# Patient Record
Sex: FEMALE | Race: WHITE | NOT HISPANIC OR LATINO | Employment: FULL TIME | URBAN - METROPOLITAN AREA
[De-identification: names, ages, dates, MRNs, and addresses within clinical notes are randomized per-mention and may not be internally consistent; named-entity substitution may affect disease eponyms.]

---

## 2017-02-06 ENCOUNTER — AMBULATORY - HEALTHEAST (OUTPATIENT)
Dept: LAB | Facility: CLINIC | Age: 52
End: 2017-02-06

## 2017-02-06 DIAGNOSIS — B35.1 NAIL FUNGUS: ICD-10-CM

## 2017-02-08 ENCOUNTER — OFFICE VISIT - HEALTHEAST (OUTPATIENT)
Dept: PULMONOLOGY | Facility: OTHER | Age: 52
End: 2017-02-08

## 2017-02-08 DIAGNOSIS — F17.200 TOBACCO USE DISORDER: ICD-10-CM

## 2017-02-08 DIAGNOSIS — J45.40 MODERATE PERSISTENT ASTHMA WITHOUT COMPLICATION: ICD-10-CM

## 2017-02-20 ENCOUNTER — COMMUNICATION - HEALTHEAST (OUTPATIENT)
Dept: PODIATRY | Facility: CLINIC | Age: 52
End: 2017-02-20

## 2017-02-20 DIAGNOSIS — B35.1 NAIL FUNGUS: ICD-10-CM

## 2017-02-23 ENCOUNTER — AMBULATORY - HEALTHEAST (OUTPATIENT)
Dept: PULMONOLOGY | Facility: OTHER | Age: 52
End: 2017-02-23

## 2017-02-23 DIAGNOSIS — J45.909 ASTHMA: ICD-10-CM

## 2017-02-27 ENCOUNTER — AMBULATORY - HEALTHEAST (OUTPATIENT)
Dept: PULMONOLOGY | Facility: OTHER | Age: 52
End: 2017-02-27

## 2017-02-27 DIAGNOSIS — J45.909 ASTHMA: ICD-10-CM

## 2017-03-17 ENCOUNTER — AMBULATORY - HEALTHEAST (OUTPATIENT)
Dept: PULMONOLOGY | Facility: OTHER | Age: 52
End: 2017-03-17

## 2017-03-17 DIAGNOSIS — J45.40 MODERATE PERSISTENT ASTHMA WITHOUT COMPLICATION: ICD-10-CM

## 2017-03-31 ENCOUNTER — COMMUNICATION - HEALTHEAST (OUTPATIENT)
Dept: FAMILY MEDICINE | Facility: CLINIC | Age: 52
End: 2017-03-31

## 2017-03-31 DIAGNOSIS — F32.A DEPRESSION: ICD-10-CM

## 2017-03-31 DIAGNOSIS — F17.201 NICOTINE DEPENDENCE IN REMISSION: ICD-10-CM

## 2017-07-21 ENCOUNTER — OFFICE VISIT - HEALTHEAST (OUTPATIENT)
Dept: FAMILY MEDICINE | Facility: CLINIC | Age: 52
End: 2017-07-21

## 2017-07-21 ENCOUNTER — RECORDS - HEALTHEAST (OUTPATIENT)
Dept: GENERAL RADIOLOGY | Facility: CLINIC | Age: 52
End: 2017-07-21

## 2017-07-21 DIAGNOSIS — M79.672 LEFT FOOT PAIN: ICD-10-CM

## 2017-07-21 DIAGNOSIS — M79.672 PAIN IN LEFT FOOT: ICD-10-CM

## 2017-07-21 DIAGNOSIS — M25.562 LEFT KNEE PAIN: ICD-10-CM

## 2017-07-21 RX ORDER — ACETAMINOPHEN 500 MG
500 TABLET ORAL EVERY 6 HOURS PRN
Status: SHIPPED | COMMUNITY
Start: 2017-07-21

## 2017-07-25 ENCOUNTER — COMMUNICATION - HEALTHEAST (OUTPATIENT)
Dept: FAMILY MEDICINE | Facility: CLINIC | Age: 52
End: 2017-07-25

## 2017-07-25 DIAGNOSIS — M25.562 LEFT KNEE PAIN: ICD-10-CM

## 2017-07-25 DIAGNOSIS — M79.672 LEFT FOOT PAIN: ICD-10-CM

## 2017-07-25 LAB — ANA SER QL: 0.6 U

## 2017-09-15 ENCOUNTER — OFFICE VISIT - HEALTHEAST (OUTPATIENT)
Dept: PULMONOLOGY | Facility: OTHER | Age: 52
End: 2017-09-15

## 2017-09-15 ENCOUNTER — RECORDS - HEALTHEAST (OUTPATIENT)
Dept: ADMINISTRATIVE | Facility: OTHER | Age: 52
End: 2017-09-15

## 2017-09-15 DIAGNOSIS — F17.201 NICOTINE DEPENDENCE IN REMISSION: ICD-10-CM

## 2017-09-15 DIAGNOSIS — J45.40 MODERATE PERSISTENT ASTHMA WITHOUT COMPLICATION: ICD-10-CM

## 2017-09-18 ENCOUNTER — RECORDS - HEALTHEAST (OUTPATIENT)
Dept: GENERAL RADIOLOGY | Age: 52
End: 2017-09-18

## 2017-09-18 ENCOUNTER — OFFICE VISIT - HEALTHEAST (OUTPATIENT)
Dept: RHEUMATOLOGY | Facility: CLINIC | Age: 52
End: 2017-09-18

## 2017-09-18 DIAGNOSIS — M25.50 PAIN IN UNSPECIFIED JOINT: ICD-10-CM

## 2017-09-18 DIAGNOSIS — R76.8 RHEUMATOID FACTOR POSITIVE: ICD-10-CM

## 2017-09-18 DIAGNOSIS — M25.50 POLYARTHRALGIA: ICD-10-CM

## 2017-09-18 LAB — HCV AB SERPL QL IA: NEGATIVE

## 2017-09-18 ASSESSMENT — MIFFLIN-ST. JEOR: SCORE: 1195.52

## 2017-09-19 LAB
C-ANCA - HISTORICAL: NEGATIVE
P-ANCA - HISTORICAL: NEGATIVE

## 2017-10-18 ENCOUNTER — OFFICE VISIT - HEALTHEAST (OUTPATIENT)
Dept: FAMILY MEDICINE | Facility: CLINIC | Age: 52
End: 2017-10-18

## 2017-10-18 DIAGNOSIS — Z00.00 HEALTH CARE MAINTENANCE: ICD-10-CM

## 2017-10-18 DIAGNOSIS — Z12.31 VISIT FOR SCREENING MAMMOGRAM: ICD-10-CM

## 2017-10-18 DIAGNOSIS — Z23 NEED FOR VACCINATION: ICD-10-CM

## 2017-10-18 DIAGNOSIS — M25.50 JOINT PAIN: ICD-10-CM

## 2017-10-18 DIAGNOSIS — F34.1 DYSTHYMIA: ICD-10-CM

## 2017-10-18 DIAGNOSIS — J45.20 INTERMITTENT ASTHMA, UNCOMPLICATED: ICD-10-CM

## 2017-10-18 DIAGNOSIS — B00.9 HERPES: ICD-10-CM

## 2017-10-18 ASSESSMENT — MIFFLIN-ST. JEOR: SCORE: 1207.78

## 2017-11-02 ENCOUNTER — COMMUNICATION - HEALTHEAST (OUTPATIENT)
Dept: FAMILY MEDICINE | Facility: CLINIC | Age: 52
End: 2017-11-02

## 2017-11-03 ENCOUNTER — AMBULATORY - HEALTHEAST (OUTPATIENT)
Dept: FAMILY MEDICINE | Facility: CLINIC | Age: 52
End: 2017-11-03

## 2017-11-22 ENCOUNTER — OFFICE VISIT - HEALTHEAST (OUTPATIENT)
Dept: FAMILY MEDICINE | Facility: CLINIC | Age: 52
End: 2017-11-22

## 2017-11-22 DIAGNOSIS — G47.00 INSOMNIA: ICD-10-CM

## 2017-12-08 ENCOUNTER — HOSPITAL ENCOUNTER (OUTPATIENT)
Dept: MAMMOGRAPHY | Facility: HOSPITAL | Age: 52
Discharge: HOME | End: 2017-12-08
Attending: FAMILY MEDICINE

## 2017-12-08 DIAGNOSIS — Z12.31 VISIT FOR SCREENING MAMMOGRAM: ICD-10-CM

## 2018-01-10 ENCOUNTER — COMMUNICATION - HEALTHEAST (OUTPATIENT)
Dept: FAMILY MEDICINE | Facility: CLINIC | Age: 53
End: 2018-01-10

## 2018-01-10 DIAGNOSIS — A60.00 GENITAL HERPES: ICD-10-CM

## 2018-04-03 ENCOUNTER — OFFICE VISIT - HEALTHEAST (OUTPATIENT)
Dept: FAMILY MEDICINE | Facility: CLINIC | Age: 53
End: 2018-04-03

## 2018-04-03 DIAGNOSIS — M25.531 RIGHT WRIST PAIN: ICD-10-CM

## 2018-04-03 ASSESSMENT — MIFFLIN-ST. JEOR: SCORE: 1249.51

## 2018-06-07 ENCOUNTER — AMBULATORY - HEALTHEAST (OUTPATIENT)
Dept: PULMONOLOGY | Facility: OTHER | Age: 53
End: 2018-06-07

## 2018-06-07 DIAGNOSIS — J45.40 MODERATE PERSISTENT ASTHMA WITHOUT COMPLICATION: ICD-10-CM

## 2018-06-07 DIAGNOSIS — J45.30 MILD PERSISTENT ASTHMA WITHOUT COMPLICATION: ICD-10-CM

## 2018-06-15 ENCOUNTER — COMMUNICATION - HEALTHEAST (OUTPATIENT)
Dept: PULMONOLOGY | Facility: OTHER | Age: 53
End: 2018-06-15

## 2018-06-15 ENCOUNTER — AMBULATORY - HEALTHEAST (OUTPATIENT)
Dept: PULMONOLOGY | Facility: OTHER | Age: 53
End: 2018-06-15

## 2018-06-15 DIAGNOSIS — J45.40 MODERATE PERSISTENT ASTHMA: ICD-10-CM

## 2018-06-26 ENCOUNTER — OFFICE VISIT - HEALTHEAST (OUTPATIENT)
Dept: FAMILY MEDICINE | Facility: CLINIC | Age: 53
End: 2018-06-26

## 2018-06-26 DIAGNOSIS — M72.2 PLANTAR FASCIITIS: ICD-10-CM

## 2018-06-26 DIAGNOSIS — M76.61 ACHILLES TENDINITIS OF BOTH LOWER EXTREMITIES: ICD-10-CM

## 2018-06-26 DIAGNOSIS — E66.811 CLASS 1 OBESITY WITHOUT SERIOUS COMORBIDITY WITH BODY MASS INDEX (BMI) OF 30.0 TO 30.9 IN ADULT, UNSPECIFIED OBESITY TYPE: ICD-10-CM

## 2018-06-26 DIAGNOSIS — M76.62 ACHILLES TENDINITIS OF BOTH LOWER EXTREMITIES: ICD-10-CM

## 2018-06-26 LAB
ERYTHROCYTE [DISTWIDTH] IN BLOOD BY AUTOMATED COUNT: 11 % (ref 11–14.5)
HCT VFR BLD AUTO: 40.9 % (ref 35–47)
HGB BLD-MCNC: 13.7 G/DL (ref 12–16)
MCH RBC QN AUTO: 32.9 PG (ref 27–34)
MCHC RBC AUTO-ENTMCNC: 33.6 G/DL (ref 32–36)
MCV RBC AUTO: 98 FL (ref 80–100)
PLATELET # BLD AUTO: 253 THOU/UL (ref 140–440)
PMV BLD AUTO: 6.9 FL (ref 7–10)
RBC # BLD AUTO: 4.17 MILL/UL (ref 3.8–5.4)
TSH SERPL DL<=0.005 MIU/L-ACNC: 1.75 UIU/ML (ref 0.3–5)
WBC: 7.6 THOU/UL (ref 4–11)

## 2018-06-26 ASSESSMENT — MIFFLIN-ST. JEOR: SCORE: 1224.9

## 2018-07-25 ENCOUNTER — OFFICE VISIT - HEALTHEAST (OUTPATIENT)
Dept: PULMONOLOGY | Facility: OTHER | Age: 53
End: 2018-07-25

## 2018-07-25 DIAGNOSIS — F17.201 NICOTINE DEPENDENCE IN REMISSION: ICD-10-CM

## 2018-07-25 DIAGNOSIS — J45.20 MILD INTERMITTENT ASTHMA WITHOUT COMPLICATION: ICD-10-CM

## 2018-07-25 DIAGNOSIS — J45.40 MODERATE PERSISTENT ASTHMA WITHOUT COMPLICATION: ICD-10-CM

## 2018-07-25 DIAGNOSIS — R05.9 COUGH: ICD-10-CM

## 2018-08-20 ENCOUNTER — AMBULATORY - HEALTHEAST (OUTPATIENT)
Dept: PULMONOLOGY | Facility: OTHER | Age: 53
End: 2018-08-20

## 2018-08-20 DIAGNOSIS — J45.30 MILD PERSISTENT ASTHMA WITHOUT COMPLICATION: ICD-10-CM

## 2018-08-21 ENCOUNTER — AMBULATORY - HEALTHEAST (OUTPATIENT)
Dept: PULMONOLOGY | Facility: OTHER | Age: 53
End: 2018-08-21

## 2018-08-21 DIAGNOSIS — J45.40 MODERATE PERSISTENT ASTHMA WITHOUT COMPLICATION: ICD-10-CM

## 2018-10-22 ENCOUNTER — OFFICE VISIT - HEALTHEAST (OUTPATIENT)
Dept: FAMILY MEDICINE | Facility: CLINIC | Age: 53
End: 2018-10-22

## 2018-10-22 DIAGNOSIS — R19.7 VOMITING AND DIARRHEA: ICD-10-CM

## 2018-10-22 DIAGNOSIS — R10.9 STOMACH DISCOMFORT: ICD-10-CM

## 2018-10-22 DIAGNOSIS — R11.10 VOMITING AND DIARRHEA: ICD-10-CM

## 2019-01-23 ENCOUNTER — OFFICE VISIT - HEALTHEAST (OUTPATIENT)
Dept: FAMILY MEDICINE | Facility: CLINIC | Age: 54
End: 2019-01-23

## 2019-01-23 DIAGNOSIS — R53.83 FATIGUE, UNSPECIFIED TYPE: ICD-10-CM

## 2019-01-23 DIAGNOSIS — Z00.00 HEALTH MAINTENANCE EXAMINATION: ICD-10-CM

## 2019-01-23 DIAGNOSIS — A60.00 GENITAL HERPES: ICD-10-CM

## 2019-01-23 DIAGNOSIS — Z12.31 VISIT FOR SCREENING MAMMOGRAM: ICD-10-CM

## 2019-01-23 DIAGNOSIS — G47.00 INSOMNIA, UNSPECIFIED TYPE: ICD-10-CM

## 2019-01-23 ASSESSMENT — MIFFLIN-ST. JEOR: SCORE: 1215.71

## 2019-01-31 ENCOUNTER — COMMUNICATION - HEALTHEAST (OUTPATIENT)
Dept: FAMILY MEDICINE | Facility: CLINIC | Age: 54
End: 2019-01-31

## 2019-02-01 ENCOUNTER — AMBULATORY - HEALTHEAST (OUTPATIENT)
Dept: FAMILY MEDICINE | Facility: CLINIC | Age: 54
End: 2019-02-01

## 2019-02-01 DIAGNOSIS — R53.83 FATIGUE, UNSPECIFIED TYPE: ICD-10-CM

## 2019-02-04 ENCOUNTER — AMBULATORY - HEALTHEAST (OUTPATIENT)
Dept: LAB | Facility: CLINIC | Age: 54
End: 2019-02-04

## 2019-02-04 DIAGNOSIS — R53.83 FATIGUE, UNSPECIFIED TYPE: ICD-10-CM

## 2019-02-04 LAB
CHOLEST SERPL-MCNC: 216 MG/DL
ERYTHROCYTE [DISTWIDTH] IN BLOOD BY AUTOMATED COUNT: 11 % (ref 11–14.5)
ERYTHROCYTE [SEDIMENTATION RATE] IN BLOOD BY WESTERGREN METHOD: 12 MM/HR (ref 0–20)
FASTING STATUS PATIENT QL REPORTED: YES
HCT VFR BLD AUTO: 39.4 % (ref 35–47)
HDLC SERPL-MCNC: 77 MG/DL
HGB BLD-MCNC: 13.2 G/DL (ref 12–16)
LDLC SERPL CALC-MCNC: 126 MG/DL
MCH RBC QN AUTO: 32.6 PG (ref 27–34)
MCHC RBC AUTO-ENTMCNC: 33.6 G/DL (ref 32–36)
MCV RBC AUTO: 97 FL (ref 80–100)
PLATELET # BLD AUTO: 269 THOU/UL (ref 140–440)
PMV BLD AUTO: 7 FL (ref 7–10)
RBC # BLD AUTO: 4.06 MILL/UL (ref 3.8–5.4)
RHEUMATOID FACT SERPL-ACNC: 229.8 IU/ML (ref 0–30)
TRIGL SERPL-MCNC: 66 MG/DL
TSH SERPL DL<=0.005 MIU/L-ACNC: 1.1 UIU/ML (ref 0.3–5)
VIT B12 SERPL-MCNC: 294 PG/ML (ref 213–816)
WBC: 7.7 THOU/UL (ref 4–11)

## 2019-02-05 LAB
ANA SER QL: 0.4 U
CCP AB SER IA-ACNC: 10.2 U/ML

## 2019-02-06 ENCOUNTER — COMMUNICATION - HEALTHEAST (OUTPATIENT)
Dept: FAMILY MEDICINE | Facility: CLINIC | Age: 54
End: 2019-02-06

## 2019-02-18 ENCOUNTER — AMBULATORY - HEALTHEAST (OUTPATIENT)
Dept: LAB | Facility: CLINIC | Age: 54
End: 2019-02-18

## 2019-02-18 DIAGNOSIS — Z79.899 NEED FOR PROPHYLACTIC CHEMOTHERAPY: ICD-10-CM

## 2019-02-18 DIAGNOSIS — L40.59 POLYARTICULAR PSORIATIC ARTHRITIS (H): ICD-10-CM

## 2019-02-18 DIAGNOSIS — E55.9 AVITAMINOSIS D: ICD-10-CM

## 2019-02-20 ENCOUNTER — AMBULATORY - HEALTHEAST (OUTPATIENT)
Dept: LAB | Facility: CLINIC | Age: 54
End: 2019-02-20

## 2019-02-20 DIAGNOSIS — L40.59 POLYARTICULAR PSORIATIC ARTHRITIS (H): ICD-10-CM

## 2019-02-20 LAB
ALBUMIN SERPL-MCNC: 4.1 G/DL (ref 3.5–5)
ALT SERPL W P-5'-P-CCNC: 11 U/L (ref 0–45)
AST SERPL W P-5'-P-CCNC: 16 U/L (ref 0–40)
CALCIUM SERPL-MCNC: 9.9 MG/DL (ref 8.5–10.5)
CREAT SERPL-MCNC: 0.62 MG/DL (ref 0.6–1.1)
ERYTHROCYTE [DISTWIDTH] IN BLOOD BY AUTOMATED COUNT: 10.7 % (ref 11–14.5)
GFR SERPL CREATININE-BSD FRML MDRD: >60 ML/MIN/1.73M2
HCT VFR BLD AUTO: 40.6 % (ref 35–47)
HGB BLD-MCNC: 13.6 G/DL (ref 12–16)
MCH RBC QN AUTO: 32.3 PG (ref 27–34)
MCHC RBC AUTO-ENTMCNC: 33.5 G/DL (ref 32–36)
MCV RBC AUTO: 97 FL (ref 80–100)
PLATELET # BLD AUTO: 81 THOU/UL (ref 140–440)
PMV BLD AUTO: 7.9 FL (ref 7–10)
RBC # BLD AUTO: 4.21 MILL/UL (ref 3.8–5.4)
WBC: 7.5 THOU/UL (ref 4–11)

## 2019-02-21 LAB
25(OH)D3 SERPL-MCNC: 34.5 NG/ML (ref 30–80)
HBV SURFACE AG SERPL QL IA: NEGATIVE
HCV AB SERPL QL IA: NEGATIVE

## 2019-02-22 LAB
GAMMA INTERFERON BACKGROUND BLD IA-ACNC: 0.03 IU/ML
M TB IFN-G BLD-IMP: NEGATIVE
MITOGEN IGNF BCKGRD COR BLD-ACNC: 0 IU/ML
MITOGEN IGNF BCKGRD COR BLD-ACNC: 0.01 IU/ML
QTF INTERPRETATION: NORMAL
QTF MITOGEN - NIL: 8.8 IU/ML

## 2019-02-25 ENCOUNTER — HOSPITAL ENCOUNTER (OUTPATIENT)
Dept: MAMMOGRAPHY | Facility: CLINIC | Age: 54
Discharge: HOME | End: 2019-02-25

## 2019-02-25 ENCOUNTER — RECORDS - HEALTHEAST (OUTPATIENT)
Dept: ADMINISTRATIVE | Facility: OTHER | Age: 54
End: 2019-02-25

## 2019-02-25 DIAGNOSIS — Z12.31 VISIT FOR SCREENING MAMMOGRAM: ICD-10-CM

## 2019-02-26 ENCOUNTER — RECORDS - HEALTHEAST (OUTPATIENT)
Dept: GENERAL RADIOLOGY | Facility: CLINIC | Age: 54
End: 2019-02-26

## 2019-02-26 DIAGNOSIS — R60.9 EDEMA, UNSPECIFIED: ICD-10-CM

## 2019-02-26 DIAGNOSIS — R52 PAIN, UNSPECIFIED: ICD-10-CM

## 2019-02-26 LAB
B LOCUS: NORMAL
B27TEST METHOD: NORMAL

## 2019-10-01 ENCOUNTER — OFFICE VISIT - HEALTHEAST (OUTPATIENT)
Dept: PULMONOLOGY | Facility: OTHER | Age: 54
End: 2019-10-01

## 2019-10-01 DIAGNOSIS — R06.2 WHEEZING: ICD-10-CM

## 2019-10-01 DIAGNOSIS — J45.40 MODERATE PERSISTENT ASTHMA WITHOUT COMPLICATION: ICD-10-CM

## 2019-10-01 RX ORDER — ALBUTEROL SULFATE 90 UG/1
2 AEROSOL, METERED RESPIRATORY (INHALATION) EVERY 6 HOURS PRN
Qty: 3 INHALER | Refills: 3 | Status: SHIPPED | OUTPATIENT
Start: 2019-10-01

## 2019-11-14 ENCOUNTER — OFFICE VISIT - HEALTHEAST (OUTPATIENT)
Dept: FAMILY MEDICINE | Facility: CLINIC | Age: 54
End: 2019-11-14

## 2019-11-14 DIAGNOSIS — H01.003 BLEPHARITIS OF EYELID OF RIGHT EYE, UNSPECIFIED EYELID, UNSPECIFIED TYPE: ICD-10-CM

## 2020-02-03 ENCOUNTER — COMMUNICATION - HEALTHEAST (OUTPATIENT)
Dept: FAMILY MEDICINE | Facility: CLINIC | Age: 55
End: 2020-02-03

## 2020-02-04 ENCOUNTER — AMBULATORY - HEALTHEAST (OUTPATIENT)
Dept: FAMILY MEDICINE | Facility: CLINIC | Age: 55
End: 2020-02-04

## 2020-02-04 DIAGNOSIS — Z00.00 HEALTH CARE MAINTENANCE: ICD-10-CM

## 2020-02-07 ENCOUNTER — AMBULATORY - HEALTHEAST (OUTPATIENT)
Dept: LAB | Facility: CLINIC | Age: 55
End: 2020-02-07

## 2020-02-07 DIAGNOSIS — Z00.00 HEALTH CARE MAINTENANCE: ICD-10-CM

## 2020-02-07 LAB
ALBUMIN SERPL-MCNC: 4.1 G/DL (ref 3.5–5)
ALP SERPL-CCNC: 55 U/L (ref 45–120)
ALT SERPL W P-5'-P-CCNC: 19 U/L (ref 0–45)
ANION GAP SERPL CALCULATED.3IONS-SCNC: 7 MMOL/L (ref 5–18)
AST SERPL W P-5'-P-CCNC: 19 U/L (ref 0–40)
BILIRUB SERPL-MCNC: 0.6 MG/DL (ref 0–1)
BUN SERPL-MCNC: 12 MG/DL (ref 8–22)
CALCIUM SERPL-MCNC: 9.5 MG/DL (ref 8.5–10.5)
CHLORIDE BLD-SCNC: 103 MMOL/L (ref 98–107)
CHOLEST SERPL-MCNC: 211 MG/DL
CO2 SERPL-SCNC: 29 MMOL/L (ref 22–31)
CREAT SERPL-MCNC: 0.66 MG/DL (ref 0.6–1.1)
ERYTHROCYTE [DISTWIDTH] IN BLOOD BY AUTOMATED COUNT: 11.5 % (ref 11–14.5)
FASTING STATUS PATIENT QL REPORTED: YES
GFR SERPL CREATININE-BSD FRML MDRD: >60 ML/MIN/1.73M2
GLUCOSE BLD-MCNC: 84 MG/DL (ref 70–125)
HCT VFR BLD AUTO: 38.9 % (ref 35–47)
HDLC SERPL-MCNC: 71 MG/DL
HGB BLD-MCNC: 12.9 G/DL (ref 12–16)
LDLC SERPL CALC-MCNC: 129 MG/DL
MCH RBC QN AUTO: 34.7 PG (ref 27–34)
MCHC RBC AUTO-ENTMCNC: 33.2 G/DL (ref 32–36)
MCV RBC AUTO: 104 FL (ref 80–100)
PLATELET # BLD AUTO: 264 THOU/UL (ref 140–440)
PMV BLD AUTO: 6.4 FL (ref 7–10)
POTASSIUM BLD-SCNC: 5 MMOL/L (ref 3.5–5)
PROT SERPL-MCNC: 6.7 G/DL (ref 6–8)
RBC # BLD AUTO: 3.73 MILL/UL (ref 3.8–5.4)
SODIUM SERPL-SCNC: 139 MMOL/L (ref 136–145)
TRIGL SERPL-MCNC: 56 MG/DL
WBC: 4.6 THOU/UL (ref 4–11)

## 2020-02-21 ENCOUNTER — OFFICE VISIT - HEALTHEAST (OUTPATIENT)
Dept: FAMILY MEDICINE | Facility: CLINIC | Age: 55
End: 2020-02-21

## 2020-02-21 DIAGNOSIS — L30.9 DERMATITIS: ICD-10-CM

## 2020-02-21 DIAGNOSIS — G47.00 INSOMNIA, UNSPECIFIED TYPE: ICD-10-CM

## 2020-02-21 DIAGNOSIS — M05.9 RHEUMATOID ARTHRITIS WITH POSITIVE RHEUMATOID FACTOR, INVOLVING UNSPECIFIED SITE (H): ICD-10-CM

## 2020-02-21 DIAGNOSIS — Z00.00 HEALTH CARE MAINTENANCE: ICD-10-CM

## 2020-02-21 DIAGNOSIS — A60.00 GENITAL HERPES: ICD-10-CM

## 2020-02-21 DIAGNOSIS — F33.41 RECURRENT MAJOR DEPRESSIVE DISORDER, IN PARTIAL REMISSION (H): ICD-10-CM

## 2020-02-21 DIAGNOSIS — J30.2 SEASONAL ALLERGIES: ICD-10-CM

## 2020-02-21 RX ORDER — PREDNISONE 5 MG/1
TABLET ORAL
Status: SHIPPED | COMMUNITY
Start: 2020-02-12

## 2020-02-21 RX ORDER — VALACYCLOVIR HYDROCHLORIDE 500 MG/1
500 TABLET, FILM COATED ORAL 2 TIMES DAILY
Qty: 180 TABLET | Refills: 3 | Status: SHIPPED | OUTPATIENT
Start: 2020-02-21

## 2020-02-21 RX ORDER — SULFASALAZINE 500 MG/1
TABLET ORAL
Status: SHIPPED | COMMUNITY
Start: 2020-02-12

## 2020-02-21 RX ORDER — FOLIC ACID 1 MG/1
TABLET ORAL
Status: SHIPPED | COMMUNITY
Start: 2020-01-30

## 2020-02-21 ASSESSMENT — ANXIETY QUESTIONNAIRES
7. FEELING AFRAID AS IF SOMETHING AWFUL MIGHT HAPPEN: NOT AT ALL
2. NOT BEING ABLE TO STOP OR CONTROL WORRYING: NOT AT ALL
1. FEELING NERVOUS, ANXIOUS, OR ON EDGE: SEVERAL DAYS
GAD7 TOTAL SCORE: 2
IF YOU CHECKED OFF ANY PROBLEMS ON THIS QUESTIONNAIRE, HOW DIFFICULT HAVE THESE PROBLEMS MADE IT FOR YOU TO DO YOUR WORK, TAKE CARE OF THINGS AT HOME, OR GET ALONG WITH OTHER PEOPLE: SOMEWHAT DIFFICULT
5. BEING SO RESTLESS THAT IT IS HARD TO SIT STILL: NOT AT ALL
4. TROUBLE RELAXING: NOT AT ALL
6. BECOMING EASILY ANNOYED OR IRRITABLE: SEVERAL DAYS
3. WORRYING TOO MUCH ABOUT DIFFERENT THINGS: NOT AT ALL

## 2020-02-21 ASSESSMENT — PATIENT HEALTH QUESTIONNAIRE - PHQ9: SUM OF ALL RESPONSES TO PHQ QUESTIONS 1-9: 7

## 2020-02-21 ASSESSMENT — MIFFLIN-ST. JEOR: SCORE: 1174.88

## 2020-03-12 ENCOUNTER — COMMUNICATION - HEALTHEAST (OUTPATIENT)
Dept: FAMILY MEDICINE | Facility: CLINIC | Age: 55
End: 2020-03-12

## 2020-03-12 DIAGNOSIS — L30.9 DERMATITIS: ICD-10-CM

## 2020-03-12 RX ORDER — CETIRIZINE HYDROCHLORIDE 10 MG/1
10 TABLET ORAL DAILY
Qty: 30 TABLET | Refills: 0 | Status: SHIPPED | OUTPATIENT
Start: 2020-03-12

## 2020-04-28 ENCOUNTER — AMBULATORY - HEALTHEAST (OUTPATIENT)
Dept: LAB | Facility: CLINIC | Age: 55
End: 2020-04-28

## 2020-04-28 DIAGNOSIS — G56.00 CTS (CARPAL TUNNEL SYNDROME): ICD-10-CM

## 2020-04-28 LAB
ALT SERPL W P-5'-P-CCNC: 15 U/L (ref 0–45)
AST SERPL W P-5'-P-CCNC: 15 U/L (ref 0–40)
BASOPHILS # BLD AUTO: 0 THOU/UL (ref 0–0.2)
BASOPHILS NFR BLD AUTO: 0 % (ref 0–2)
CREAT SERPL-MCNC: 0.65 MG/DL (ref 0.6–1.1)
EOSINOPHIL # BLD AUTO: 0.1 THOU/UL (ref 0–0.4)
EOSINOPHIL NFR BLD AUTO: 3 % (ref 0–6)
ERYTHROCYTE [DISTWIDTH] IN BLOOD BY AUTOMATED COUNT: 11.5 % (ref 11–14.5)
GFR SERPL CREATININE-BSD FRML MDRD: >60 ML/MIN/1.73M2
HCT VFR BLD AUTO: 38.2 % (ref 35–47)
HGB BLD-MCNC: 12.7 G/DL (ref 12–16)
LYMPHOCYTES # BLD AUTO: 1.5 THOU/UL (ref 0.8–4.4)
LYMPHOCYTES NFR BLD AUTO: 33 % (ref 20–40)
MCH RBC QN AUTO: 35.1 PG (ref 27–34)
MCHC RBC AUTO-ENTMCNC: 33.3 G/DL (ref 32–36)
MCV RBC AUTO: 106 FL (ref 80–100)
MONOCYTES # BLD AUTO: 0.5 THOU/UL (ref 0–0.9)
MONOCYTES NFR BLD AUTO: 12 % (ref 2–10)
NEUTROPHILS # BLD AUTO: 2.3 THOU/UL (ref 2–7.7)
NEUTROPHILS NFR BLD AUTO: 52 % (ref 50–70)
PLATELET # BLD AUTO: 236 THOU/UL (ref 140–440)
PMV BLD AUTO: 6.7 FL (ref 7–10)
RBC # BLD AUTO: 3.62 MILL/UL (ref 3.8–5.4)
RHEUMATOID FACT SERPL-ACNC: 55.7 IU/ML (ref 0–30)
WBC: 4.4 THOU/UL (ref 4–11)

## 2020-05-20 ENCOUNTER — HOSPITAL ENCOUNTER (OUTPATIENT)
Dept: MAMMOGRAPHY | Facility: CLINIC | Age: 55
Discharge: HOME | End: 2020-05-20
Attending: FAMILY MEDICINE

## 2020-05-20 DIAGNOSIS — Z12.31 VISIT FOR SCREENING MAMMOGRAM: ICD-10-CM

## 2020-10-02 ENCOUNTER — COMMUNICATION - HEALTHEAST (OUTPATIENT)
Dept: FAMILY MEDICINE | Facility: CLINIC | Age: 55
End: 2020-10-02

## 2020-10-12 ENCOUNTER — OFFICE VISIT - HEALTHEAST (OUTPATIENT)
Dept: FAMILY MEDICINE | Facility: CLINIC | Age: 55
End: 2020-10-12

## 2020-10-12 DIAGNOSIS — M54.9 UPPER BACK PAIN: ICD-10-CM
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
## 2020-10-12 DIAGNOSIS — R42 DIZZINESS: ICD-10-CM

## 2020-10-12 DIAGNOSIS — S29.012A UPPER BACK STRAIN, INITIAL ENCOUNTER: ICD-10-CM

## 2020-10-12 ASSESSMENT — MIFFLIN-ST. JEOR: SCORE: 1161.28

## 2020-11-03 ENCOUNTER — OFFICE VISIT - HEALTHEAST (OUTPATIENT)
Dept: PHYSICAL THERAPY | Facility: REHABILITATION | Age: 55
End: 2020-11-03

## 2020-11-03 DIAGNOSIS — M54.6 THORACIC SPINE PAIN: ICD-10-CM

## 2020-11-03 DIAGNOSIS — M79.18 MYOFASCIAL PAIN: ICD-10-CM
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
## 2020-11-24 ENCOUNTER — OFFICE VISIT - HEALTHEAST (OUTPATIENT)
Dept: PHYSICAL THERAPY | Facility: REHABILITATION | Age: 55
End: 2020-11-24
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
## 2020-11-24 DIAGNOSIS — M54.6 THORACIC SPINE PAIN: ICD-10-CM

## 2020-11-24 DIAGNOSIS — M79.18 MYOFASCIAL PAIN: ICD-10-CM

## 2020-12-01 ENCOUNTER — OFFICE VISIT - HEALTHEAST (OUTPATIENT)
Dept: PHYSICAL THERAPY | Facility: REHABILITATION | Age: 55
End: 2020-12-01

## 2020-12-01 DIAGNOSIS — M54.6 THORACIC SPINE PAIN: ICD-10-CM

## 2020-12-01 DIAGNOSIS — M79.18 MYOFASCIAL PAIN: ICD-10-CM
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
## 2020-12-16 ENCOUNTER — OFFICE VISIT - HEALTHEAST (OUTPATIENT)
Dept: PHYSICAL THERAPY | Facility: REHABILITATION | Age: 55
End: 2020-12-16

## 2020-12-16 DIAGNOSIS — M54.6 THORACIC SPINE PAIN: ICD-10-CM

## 2020-12-16 DIAGNOSIS — M79.18 MYOFASCIAL PAIN: ICD-10-CM
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
## 2020-12-23 ENCOUNTER — OFFICE VISIT - HEALTHEAST (OUTPATIENT)
Dept: PHYSICAL THERAPY | Facility: REHABILITATION | Age: 55
End: 2020-12-23
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
## 2020-12-23 DIAGNOSIS — M79.18 MYOFASCIAL PAIN: ICD-10-CM

## 2020-12-23 DIAGNOSIS — M54.6 THORACIC SPINE PAIN: ICD-10-CM

## 2020-12-24 ENCOUNTER — AMBULATORY - HEALTHEAST (OUTPATIENT)
Dept: LAB | Facility: CLINIC | Age: 55
End: 2020-12-24

## 2020-12-24 DIAGNOSIS — G56.00 CTS (CARPAL TUNNEL SYNDROME): ICD-10-CM

## 2020-12-24 LAB
ALT SERPL W P-5'-P-CCNC: 16 U/L (ref 0–45)
AST SERPL W P-5'-P-CCNC: 16 U/L (ref 0–40)
BASOPHILS # BLD AUTO: 0 THOU/UL (ref 0–0.2)
BASOPHILS NFR BLD AUTO: 0 % (ref 0–2)
CREAT SERPL-MCNC: 0.64 MG/DL (ref 0.6–1.1)
EOSINOPHIL # BLD AUTO: 0.1 THOU/UL (ref 0–0.4)
EOSINOPHIL NFR BLD AUTO: 2 % (ref 0–6)
ERYTHROCYTE [DISTWIDTH] IN BLOOD BY AUTOMATED COUNT: 10.7 % (ref 11–14.5)
GFR SERPL CREATININE-BSD FRML MDRD: >60 ML/MIN/1.73M2
HCT VFR BLD AUTO: 36.2 % (ref 35–47)
HGB BLD-MCNC: 12.1 G/DL (ref 12–16)
LYMPHOCYTES # BLD AUTO: 2.3 THOU/UL (ref 0.8–4.4)
LYMPHOCYTES NFR BLD AUTO: 44 % (ref 20–40)
MCH RBC QN AUTO: 34.6 PG (ref 27–34)
MCHC RBC AUTO-ENTMCNC: 33.4 G/DL (ref 32–36)
MCV RBC AUTO: 104 FL (ref 80–100)
MONOCYTES # BLD AUTO: 0.4 THOU/UL (ref 0–0.9)
MONOCYTES NFR BLD AUTO: 7 % (ref 2–10)
NEUTROPHILS # BLD AUTO: 2.5 THOU/UL (ref 2–7.7)
NEUTROPHILS NFR BLD AUTO: 47 % (ref 50–70)
PLATELET # BLD AUTO: 269 THOU/UL (ref 140–440)
PMV BLD AUTO: 6.7 FL (ref 7–10)
RBC # BLD AUTO: 3.5 MILL/UL (ref 3.8–5.4)
RHEUMATOID FACT SERPL-ACNC: 44.8 IU/ML (ref 0–30)
WBC: 5.3 THOU/UL (ref 4–11)

## 2021-01-13 ENCOUNTER — AMBULATORY - HEALTHEAST (OUTPATIENT)
Dept: FAMILY MEDICINE | Facility: CLINIC | Age: 56
End: 2021-01-13

## 2021-01-13 ENCOUNTER — OFFICE VISIT - HEALTHEAST (OUTPATIENT)
Dept: FAMILY MEDICINE | Facility: CLINIC | Age: 56
End: 2021-01-13

## 2021-01-13 DIAGNOSIS — J02.9 SORE THROAT: ICD-10-CM

## 2021-01-13 DIAGNOSIS — Z20.822 SUSPECTED COVID-19 VIRUS INFECTION: ICD-10-CM

## 2021-01-14 ENCOUNTER — COMMUNICATION - HEALTHEAST (OUTPATIENT)
Dept: SCHEDULING | Facility: CLINIC | Age: 56
End: 2021-01-14

## 2021-02-25 ENCOUNTER — OFFICE VISIT - HEALTHEAST (OUTPATIENT)
Dept: FAMILY MEDICINE | Facility: CLINIC | Age: 56
End: 2021-02-25

## 2021-02-25 DIAGNOSIS — J45.20 MILD INTERMITTENT ASTHMA WITHOUT COMPLICATION: ICD-10-CM

## 2021-02-25 DIAGNOSIS — G47.00 INSOMNIA, UNSPECIFIED TYPE: ICD-10-CM

## 2021-02-25 DIAGNOSIS — F32.1 MODERATE MAJOR DEPRESSION (H): ICD-10-CM

## 2021-02-25 DIAGNOSIS — F33.41 RECURRENT MAJOR DEPRESSIVE DISORDER, IN PARTIAL REMISSION (H): ICD-10-CM

## 2021-02-25 DIAGNOSIS — R12 HEART BURN: ICD-10-CM

## 2021-02-25 DIAGNOSIS — L40.50 PSORIATIC ARTHRITIS (H): ICD-10-CM

## 2021-02-25 DIAGNOSIS — K21.00 GASTROESOPHAGEAL REFLUX DISEASE WITH ESOPHAGITIS WITHOUT HEMORRHAGE: ICD-10-CM

## 2021-02-25 DIAGNOSIS — Z00.00 ENCOUNTER FOR SCREENING AND PREVENTATIVE CARE: ICD-10-CM

## 2021-02-25 DIAGNOSIS — M05.9 RHEUMATOID ARTHRITIS WITH POSITIVE RHEUMATOID FACTOR, INVOLVING UNSPECIFIED SITE (H): ICD-10-CM

## 2021-02-25 DIAGNOSIS — Z13.220 SCREENING FOR HYPERLIPIDEMIA: ICD-10-CM

## 2021-02-25 RX ORDER — BUPROPION HYDROCHLORIDE 150 MG/1
300 TABLET ORAL DAILY
Qty: 180 TABLET | Refills: 3 | Status: SHIPPED | OUTPATIENT
Start: 2021-02-25 | End: 2022-03-17

## 2021-02-25 RX ORDER — TRAZODONE HYDROCHLORIDE 50 MG/1
75 TABLET, FILM COATED ORAL AT BEDTIME
Qty: 90 TABLET | Refills: 1 | Status: SHIPPED | OUTPATIENT
Start: 2021-02-25

## 2021-02-25 ASSESSMENT — ANXIETY QUESTIONNAIRES
IF YOU CHECKED OFF ANY PROBLEMS ON THIS QUESTIONNAIRE, HOW DIFFICULT HAVE THESE PROBLEMS MADE IT FOR YOU TO DO YOUR WORK, TAKE CARE OF THINGS AT HOME, OR GET ALONG WITH OTHER PEOPLE: NOT DIFFICULT AT ALL
5. BEING SO RESTLESS THAT IT IS HARD TO SIT STILL: NOT AT ALL
6. BECOMING EASILY ANNOYED OR IRRITABLE: SEVERAL DAYS
4. TROUBLE RELAXING: SEVERAL DAYS
1. FEELING NERVOUS, ANXIOUS, OR ON EDGE: NOT AT ALL
7. FEELING AFRAID AS IF SOMETHING AWFUL MIGHT HAPPEN: NOT AT ALL
2. NOT BEING ABLE TO STOP OR CONTROL WORRYING: NOT AT ALL
3. WORRYING TOO MUCH ABOUT DIFFERENT THINGS: NOT AT ALL
GAD7 TOTAL SCORE: 2

## 2021-02-25 ASSESSMENT — PATIENT HEALTH QUESTIONNAIRE - PHQ9: SUM OF ALL RESPONSES TO PHQ QUESTIONS 1-9: 9

## 2021-02-25 ASSESSMENT — MIFFLIN-ST. JEOR: SCORE: 1169.44

## 2021-03-02 ENCOUNTER — AMBULATORY - HEALTHEAST (OUTPATIENT)
Dept: LAB | Facility: CLINIC | Age: 56
End: 2021-03-02

## 2021-03-02 DIAGNOSIS — Z00.00 ENCOUNTER FOR SCREENING AND PREVENTATIVE CARE: ICD-10-CM

## 2021-03-02 DIAGNOSIS — Z13.220 SCREENING FOR HYPERLIPIDEMIA: ICD-10-CM

## 2021-03-02 DIAGNOSIS — F33.41 RECURRENT MAJOR DEPRESSIVE DISORDER, IN PARTIAL REMISSION (H): ICD-10-CM

## 2021-03-02 LAB
ALBUMIN SERPL-MCNC: 4.4 G/DL (ref 3.5–5)
ALP SERPL-CCNC: 65 U/L (ref 45–120)
ALT SERPL W P-5'-P-CCNC: 17 U/L (ref 0–45)
ANION GAP SERPL CALCULATED.3IONS-SCNC: 10 MMOL/L (ref 5–18)
AST SERPL W P-5'-P-CCNC: 17 U/L (ref 0–40)
BILIRUB SERPL-MCNC: 0.4 MG/DL (ref 0–1)
BUN SERPL-MCNC: 9 MG/DL (ref 8–22)
CALCIUM SERPL-MCNC: 9.4 MG/DL (ref 8.5–10.5)
CHLORIDE BLD-SCNC: 101 MMOL/L (ref 98–107)
CHOLEST SERPL-MCNC: 238 MG/DL
CO2 SERPL-SCNC: 29 MMOL/L (ref 22–31)
CREAT SERPL-MCNC: 0.69 MG/DL (ref 0.6–1.1)
ERYTHROCYTE [DISTWIDTH] IN BLOOD BY AUTOMATED COUNT: 13 % (ref 11–14.5)
FASTING STATUS PATIENT QL REPORTED: YES
GFR SERPL CREATININE-BSD FRML MDRD: >60 ML/MIN/1.73M2
GLUCOSE BLD-MCNC: 88 MG/DL (ref 70–125)
HCT VFR BLD AUTO: 39.8 % (ref 35–47)
HDLC SERPL-MCNC: 94 MG/DL
HGB BLD-MCNC: 13.1 G/DL (ref 12–16)
LDLC SERPL CALC-MCNC: 115 MG/DL
MCH RBC QN AUTO: 34 PG (ref 27–34)
MCHC RBC AUTO-ENTMCNC: 32.9 G/DL (ref 32–36)
MCV RBC AUTO: 103 FL (ref 80–100)
PLATELET # BLD AUTO: 231 THOU/UL (ref 140–440)
PMV BLD AUTO: 8.8 FL (ref 7–10)
POTASSIUM BLD-SCNC: 4.9 MMOL/L (ref 3.5–5)
PROT SERPL-MCNC: 7 G/DL (ref 6–8)
RBC # BLD AUTO: 3.85 MILL/UL (ref 3.8–5.4)
SODIUM SERPL-SCNC: 140 MMOL/L (ref 136–145)
TRIGL SERPL-MCNC: 146 MG/DL
TSH SERPL DL<=0.005 MIU/L-ACNC: 1.55 UIU/ML (ref 0.3–5)
WBC: 6.3 THOU/UL (ref 4–11)

## 2021-03-03 LAB
25(OH)D3 SERPL-MCNC: 28.4 NG/ML (ref 30–80)
25(OH)D3 SERPL-MCNC: 28.4 NG/ML (ref 30–80)

## 2021-03-19 ENCOUNTER — COMMUNICATION - HEALTHEAST (OUTPATIENT)
Dept: INTERNAL MEDICINE | Facility: CLINIC | Age: 56
End: 2021-03-19

## 2021-03-19 ENCOUNTER — AMBULATORY - HEALTHEAST (OUTPATIENT)
Dept: NURSING | Facility: CLINIC | Age: 56
End: 2021-03-19

## 2021-03-19 DIAGNOSIS — Z23 NEED FOR SHINGLES VACCINE: ICD-10-CM

## 2021-03-26 ENCOUNTER — AMBULATORY - HEALTHEAST (OUTPATIENT)
Dept: NURSING | Facility: CLINIC | Age: 56
End: 2021-03-26

## 2021-03-29 ENCOUNTER — COMMUNICATION - HEALTHEAST (OUTPATIENT)
Dept: FAMILY MEDICINE | Facility: CLINIC | Age: 56
End: 2021-03-29

## 2021-04-16 ENCOUNTER — AMBULATORY - HEALTHEAST (OUTPATIENT)
Dept: NURSING | Facility: CLINIC | Age: 56
End: 2021-04-16

## 2021-05-26 ASSESSMENT — PATIENT HEALTH QUESTIONNAIRE - PHQ9: SUM OF ALL RESPONSES TO PHQ QUESTIONS 1-9: 7

## 2021-05-27 ASSESSMENT — PATIENT HEALTH QUESTIONNAIRE - PHQ9: SUM OF ALL RESPONSES TO PHQ QUESTIONS 1-9: 9

## 2021-05-28 ASSESSMENT — ASTHMA QUESTIONNAIRES
ACT_TOTALSCORE: 18
ACT_TOTALSCORE: 25
ACT_TOTALSCORE: 22

## 2021-05-28 ASSESSMENT — ANXIETY QUESTIONNAIRES
GAD7 TOTAL SCORE: 2
GAD7 TOTAL SCORE: 2

## 2021-05-30 VITALS — WEIGHT: 142 LBS | BODY MASS INDEX: 27.73 KG/M2

## 2021-05-31 VITALS — HEIGHT: 60 IN | WEIGHT: 150.8 LBS | BODY MASS INDEX: 29.61 KG/M2

## 2021-05-31 VITALS — BODY MASS INDEX: 28.32 KG/M2 | HEIGHT: 61 IN | WEIGHT: 150 LBS

## 2021-05-31 VITALS — WEIGHT: 147.56 LBS | BODY MASS INDEX: 28.82 KG/M2

## 2021-05-31 VITALS — WEIGHT: 153 LBS | BODY MASS INDEX: 28.91 KG/M2

## 2021-05-31 VITALS — BODY MASS INDEX: 29.1 KG/M2 | WEIGHT: 149 LBS

## 2021-06-01 VITALS — BODY MASS INDEX: 30.7 KG/M2 | WEIGHT: 156.4 LBS | HEIGHT: 60 IN

## 2021-06-01 VITALS — HEIGHT: 61 IN | BODY MASS INDEX: 30.06 KG/M2 | WEIGHT: 159.2 LBS

## 2021-06-01 VITALS — BODY MASS INDEX: 30 KG/M2 | WEIGHT: 154.9 LBS

## 2021-06-01 NOTE — PROGRESS NOTES
Pulmonary Clinic Follow Up    Cc: follow up Asthma    HPI: 51yoF with tobacco abuse (started age 45) following up for asthma.    ID: Care initiated 8/2015 when she was struggling with wheezing and humidity. After ICS/LABA symptoms significantly improved. She was initially NIOx 134(!) but decreased to 6 on therapy.      She has mainly remained abstinent from cigarettes but sneaks a few from her neighbor. She was diagnosed with rheumatoid arthritis and psoriatic arthritis and is following with [facility]. She was started Methotrexate and Hyxroxychloroquine. These have helped but always feels the best on prednisone.    She hasn't required prednisone since 2017. Continues her Symbicort. Had a cold from granddaughter and was coughing more, used her albuterol but is now back to baseline.     ACT: previously 22: 5+4+1+4+4=18 due to recent cold.      ROS: 12-point ROS performed and notable for joint pain, constipation and nausea. The remainder reviewed and negative.   Current Outpatient Medications   Medication Sig Note     acetaminophen (TYLENOL EXTRA STRENGTH) 500 MG tablet Take 500 mg by mouth every 6 (six) hours as needed for pain. 9/18/2017: As needed     albuterol (PROAIR HFA) 90 mcg/actuation inhaler Inhale 2 puffs every 6 (six) hours as needed for wheezing.      budesonide-formoterol (SYMBICORT) 160-4.5 mcg/actuation inhaler Inhale 2 puffs 2 (two) times a day.      buPROPion (WELLBUTRIN XL) 150 MG 24 hr tablet Take 2 tablets (300 mg total) by mouth daily.      fluticasone (FLONASE) 50 mcg/actuation nasal spray 2 sprays into each nostril daily.      traZODone (DESYREL) 50 MG tablet Take 1 tablet (50 mg total) by mouth at bedtime.      valACYclovir (VALTREX) 500 MG tablet Take 1 tablet (500 mg total) by mouth 2 (two) times a day.      Vitals:    10/01/19 1556   BP: 110/76   Pulse: 80   Resp: 24   SpO2: 96%   RA  Gen: NAD  HEENT; Clear oropharynx, no lesions or thrush  Neck: no lymphadenopathy  C/V: RRR, S1 and S2.  Resp:  clear bilaterally.   Ext: no edema or clubbing.   Neuro: grossly normal    ASSESSMENT/PLAN:  Asthma-mild intermittent   -Asthma Action Plan  -Symbicort   -Over 2 years without exacerbation. She is not needing intensive pulmonary follow up. I invited her to follow up PRN  -Will get flu shot next week.    RA and Psoriatic arthritis  -Methotrexate can cause pulmonary issues--discussed with her to watch for worsening dyspnea.     Tobacco abuse  -Does not meet criteria for lung screening.     Marilyn Purcell MD  Electronically signed on 10/1/2019

## 2021-06-02 VITALS — WEIGHT: 154.38 LBS | HEIGHT: 60 IN | BODY MASS INDEX: 30.31 KG/M2

## 2021-06-02 VITALS — WEIGHT: 153.8 LBS | BODY MASS INDEX: 29.79 KG/M2

## 2021-06-03 VITALS
OXYGEN SATURATION: 96 % | HEART RATE: 80 BPM | DIASTOLIC BLOOD PRESSURE: 76 MMHG | RESPIRATION RATE: 24 BRPM | WEIGHT: 149.2 LBS | SYSTOLIC BLOOD PRESSURE: 110 MMHG | BODY MASS INDEX: 28.9 KG/M2

## 2021-06-03 VITALS
BODY MASS INDEX: 28.43 KG/M2 | OXYGEN SATURATION: 100 % | WEIGHT: 146.8 LBS | DIASTOLIC BLOOD PRESSURE: 60 MMHG | HEART RATE: 78 BPM | SYSTOLIC BLOOD PRESSURE: 106 MMHG | RESPIRATION RATE: 18 BRPM | TEMPERATURE: 98.3 F

## 2021-06-04 VITALS
TEMPERATURE: 97.1 F | HEIGHT: 60 IN | WEIGHT: 148 LBS | RESPIRATION RATE: 18 BRPM | BODY MASS INDEX: 29.06 KG/M2 | DIASTOLIC BLOOD PRESSURE: 78 MMHG | HEART RATE: 78 BPM | SYSTOLIC BLOOD PRESSURE: 127 MMHG

## 2021-06-05 VITALS
OXYGEN SATURATION: 99 % | WEIGHT: 145 LBS | HEART RATE: 74 BPM | SYSTOLIC BLOOD PRESSURE: 128 MMHG | HEIGHT: 60 IN | DIASTOLIC BLOOD PRESSURE: 84 MMHG | BODY MASS INDEX: 28.47 KG/M2

## 2021-06-05 VITALS
HEIGHT: 60 IN | SYSTOLIC BLOOD PRESSURE: 120 MMHG | BODY MASS INDEX: 28.82 KG/M2 | WEIGHT: 146.8 LBS | DIASTOLIC BLOOD PRESSURE: 77 MMHG | HEART RATE: 79 BPM | OXYGEN SATURATION: 94 %

## 2021-06-05 NOTE — TELEPHONE ENCOUNTER
Left message to call back for: Lab orders   Information to relay to patient:  Left detailed message stating that lab orders have been ordered and patient can schedule lab appt.

## 2021-06-05 NOTE — TELEPHONE ENCOUNTER
Received call from patient wanting provider to place fasting lab orders for her before her appt 02/21/20 with PCP.    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  Okay to leave detailed message?  Yes; pt would like a call back to let her know orders are placed so she can schedule lab appt at [facility].

## 2021-06-06 NOTE — PATIENT INSTRUCTIONS - HE
Check with insurance on coverage for the shingle vaccine  Check about 3D imaging for mammo- or check on price    Benadryl cream and cetririzine 10mg daily for rash
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
## 2021-06-08 NOTE — PROGRESS NOTES
Pulmonary Clinic Follow Up    Cc: follow up Asthma    HPI: 51yoF with recent tobacco abuse (started age 45) following up for asthma. I first saw her 8/2015 when she was struggling with wheezing and humidity. After initiated on Symbicort BID, she was much improved. This was switched to Advair. She was initially NIOx 134(!) but today is down to 6; significantly improved. She reports her symptoms are much better and rarely wheezes. Unfortunately, her brother passed away last month and her singulair ran out so it took her a while to refill and she has just started retaking it but does notice a difference.    She was still smoking 15 cigarettes per day. Holding bupropion due to treatment for toenail fungus will resume once completed. 3 months, has 4 weeks left.    Only Rarely using rescue inhaler. ACT: 5+4+5+4+4=22      Her PFTs showed:  FEV1 1.76L, 72% predicted, improved to 84% predicted post bronchodilator, a 16% improvement  FVC 2.17L, 70% predicted  FEV1/FVC 81  Normal flow volume loop  TLC 4.26L, 94% predicted  DLCO 99% predicted    Current Outpatient Prescriptions   Medication Sig     albuterol (PROAIR HFA) 90 mcg/actuation inhaler Inhale 2 puffs every 6 (six) hours as needed for wheezing.     budesonide-formoterol (SYMBICORT) 160-4.5 mcg/actuation inhaler Inhale 2 puffs 2 (two) times a day.     fluticasone (FLONASE) 50 mcg/actuation nasal spray USE 2 SPRAYS IN EACH NOSTRIL DAILY     montelukast (SINGULAIR) 10 mg tablet Take 1 tablet (10 mg total) by mouth bedtime.     terbinafine HCl (LAMISIL) 250 mg tablet Take 1 tablet (250 mg total) by mouth daily.     valACYclovir (VALTREX) 500 MG tablet Take 1 tablet (500 mg total) by mouth 2 (two) times a day.     predniSONE (DELTASONE) 20 MG tablet Two tabs by mouth daily fo 5 days.     Vitals:    02/08/17 0805   BP: 110/72   Pulse: 82   Resp: 16   SpO2: 99%   RA  Gen: NAD  C/V: RRR, S1 and S2.  Resp: clear bilaterally. Did hear one small inspiratory wheeze at first breath  but then clear afterwards.  Ext: no edema or clubbing.     ASSESSMENT/PLAN:  Asthma  -Asthma Action Plan--prescribed prednisone for her. Best Peak flow is 350 but usually about 325 so we sued this number.  -Niox-6 down from 134  -Continue Singulair and Symbicort  -Follow up in 6 months, sooner if needed    Tobacco abuse  -remains precontemplative, encouragement provided. She does have a vague outline  -Not a candidate for lung cancer screening--age and not enough smoking history.    >50% of this 30 minute visit spent in direct patient counseling.   Marilyn Purcell MD  Electronically signed on 2/8/2017 8:42 AM

## 2021-06-09 NOTE — PROGRESS NOTES
Wandy sent an e-mail saying she has started her prednisone. She said she was having a hard time breathing and her peak flow was only 200  Which was down by 50. Prednisone reordered and will send her a copy of her Asthma action plan per her request.
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
## 2021-06-12 NOTE — TELEPHONE ENCOUNTER
Pt needs an appointment to be seen and evaluated- make sure PT is the right first step.  Please help schedule.  Dr. Vazquez

## 2021-06-12 NOTE — TELEPHONE ENCOUNTER
Please advise if appointment needed for this request. She was seen in ED.   Last visit in clinic 2/2020

## 2021-06-12 NOTE — PROGRESS NOTES
Assessment & Plan:  1. Left foot pain  XR Foot Left 3 or More VWS    Uric Acid    C-Reactive Protein(CRP)    Sedimentation Rate    Rheumatoid Factor Quant    Antinuclear Antibody (ANOOP) Cascade    Vitamin B12    Vitamin D, Total (25-Hydroxy)   2. Left knee pain  C-Reactive Protein(CRP)    Sedimentation Rate    Rheumatoid Factor Quant    Antinuclear Antibody (ANOOP) Cascade    Vitamin B12    Vitamin D, Total (25-Hydroxy)     We will x-ray the foot today, check labs to rule out gout, inflammatory process, rheumatoid arthritis.  If having no findings on testing would consider referral to podiatry.  I suspect the knee could potentially be related to patient altering her gait or potentially some ongoing arthritis that is worsening based on how she is altering her gait.  No pain on exam except for over the patella area, could potentially be some bursitis related to changing her movements.  We will see how she does with the foot and further assess the knee as necessary.  Could also potentially do PT in the future for the knee if needed.  Running on results we will have her see podiatry or orthopedics for further evaluation of her foot.    There are no Patient Instructions on file for this visit.    Orders Placed This Encounter   Procedures     XR Foot Left 3 or More VWS     Standing Status:   Future     Number of Occurrences:   1     Standing Expiration Date:   7/21/2018     Order Specific Question:   Is the patient pregnant?     Answer:   No     Order Specific Question:   Can the procedure be changed per Radiologist protocol?     Answer:   Yes     Uric Acid     C-Reactive Protein(CRP)     Sedimentation Rate     Rheumatoid Factor Quant     Antinuclear Antibody (ANOOP) Cascade     Vitamin B12     Vitamin D, Total (25-Hydroxy)     Medications Discontinued During This Encounter   Medication Reason     buPROPion (WELLBUTRIN XL) 150 MG 24 hr tablet Reorder           Chief Complaint:   Chief Complaint   Patient presents with     Foot  Pain     x 3 months. Pain in the bottom of feet      Knee Pain     x 2 wks       History of Present Illness:  Wandy is a 52 y.o. female presenting to the clinic today with foot and knee pain. Started 3 months ago, not sure of any trigger or injury. Doesn't matter what shoes she wears.  She will experience aching in the ball of the feet, swelling in the balls of feet.  Really it was occurring in both feet, now it has moved to the left foot only in right foot has resolved.  She has been using occasional ibuprofen, she cannot take it too often or she gets swollen.  No known history of arthritis, but there is family history of rheumatoid arthritis.  She is also been experiencing left knee pain for 2 weeks, right was also painful, but is now better and left has continued.  Knee is painful especially in the morning, difficult to go down stairs. Pounding is painful.  Left knee is more painful, stiff in the morning and will improve throughout the day.  She has used some Tylenol with decent effect.  No other home treatments tried.  Knee will bother her also the more active she is throughout the day.  No fevers or chills, no redness, warmth in any joint areas.  No knee swelling, some foot swelling on the left ball area of the foot.    Review of Systems:  All other systems are negative except as noted above.    PFSH:  Reviewed, updated.    Tobacco Use:  History   Smoking Status     Current Every Day Smoker     Packs/day: 0.75     Years: 5.00   Smokeless Tobacco     Not on file       Vitals:  Vitals:    07/21/17 0946   BP: 110/72   Patient Site: Left Arm   Patient Position: Sitting   Cuff Size: Adult Regular   Pulse: 72   Resp: 16   Weight: 147 lb 9 oz (66.9 kg)     Wt Readings from Last 3 Encounters:   07/21/17 147 lb 9 oz (66.9 kg)   02/08/17 142 lb (64.4 kg)   10/03/16 141 lb 12.8 oz (64.3 kg)       Physical Exam:  Constitutional:  Reveals an alert, cooperative, 52 year old female in no acute distress.   Skin:   Without  rash, bruise, or palpable lesions.  Musculoskeletal: CMS intact, distal pulses intact, no pain to palpation of the foot.  Some pain to direct palpation over the patella on the left knee, Lachman, Axel test negative.  No signs of meniscal injury on exam..  Neurologic:  No gross focal deficits.   Lymph: No lymphadenopathy.  Psychiatric:  Mood appropriate, memory intact.     Data Reviewed:  Additional History from Old Records or Another Person Summarized (2 total): None.     Decision to Obtain Extra information (1 total): None.     Radiology Tests Summarized and Ordered (XRAY/CT/MRI/DXA) (1 total): Left foot x-ray    Labs Reviewed and Ordered (1 total): None.    Medicine Tests Summarized and Ordered (EKG/ECHO/COLONOSCOPY/EGD) (1 total): None.    Independent Review of EKG or X-Ray (2 each): None.    The visit lasted a total of 25 minutes face to face with the patient. Over 50% of the time was spent counseling and educating the patient about an of care.    Medications:  Current Outpatient Prescriptions   Medication Sig Dispense Refill     acetaminophen (TYLENOL EXTRA STRENGTH) 500 MG tablet Take 500 mg by mouth every 6 (six) hours as needed for pain.       albuterol (PROAIR HFA) 90 mcg/actuation inhaler Inhale 2 puffs every 6 (six) hours as needed for wheezing. 3 Inhaler 3     budesonide-formoterol (SYMBICORT) 160-4.5 mcg/actuation inhaler Inhale 2 puffs 2 (two) times a day. 3 Inhaler 3     buPROPion (WELLBUTRIN XL) 150 MG 24 hr tablet Take 1 tablet (150 mg total) by mouth daily. 90 tablet 1     fluticasone (FLONASE) 50 mcg/actuation nasal spray USE 2 SPRAYS IN EACH NOSTRIL DAILY 48 g 2     valACYclovir (VALTREX) 500 MG tablet Take 1 tablet (500 mg total) by mouth 2 (two) times a day. 180 tablet 2     montelukast (SINGULAIR) 10 mg tablet Take 1 tablet (10 mg total) by mouth bedtime. 90 tablet 3     predniSONE (DELTASONE) 20 MG tablet Two tabs by mouth daily fo 5 days. 10 tablet 0     terbinafine HCl (LAMISIL) 250 mg  tablet TAKE 1 TABLET DAILY 90 tablet 0     No current facility-administered medications for this visit.        Total Data Points:     NATHANIEL Alvares, CNP    This note has been dictated using voice recognition software. Any grammatical or context distortions are unintentional and inherent to the software
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
## 2021-06-13 NOTE — PROGRESS NOTES
ASSESSMENT AND PLAN:  Wandy Tello 52 y.o. female is seen here on 09/18/17 for evaluation of polyarthralgias.  She very likely has beginning of osteoarthritis, this may be the reason that she is hurting for example in her knees which would be x-rayed today.  The key question here seems to be if she has rheumatoid arthritis or psoriatic arthritis or 1 of the other inflammatory conditions.  Today's examination is reassuring in the absence of any of the findings of this group of conditions.  However what happened in June, given her positive rheumatoid factor, sisters psoriasis, and arms rheumatoid arthritis, all these require careful monitoring and further workup.  X-rays of the knees taken today, personally reviewed, findings: reduced joint space in the medial compartment.  This is commensurate with osteoarthritis.  We will meet here in 3 months unless she develops swelling in any of the joint areas prior to that.  Diagnoses and all orders for this visit:    Polyarthralgia  -     CCP Antibodies  -     Hepatitis C Antibody (Anti-HCV)  -     Cytoplasmic Neutrophil Antibodies  -     Uric Acid  -     XR Knees Bilateral 1 Or 2 VWS; Future; Expected date: 9/18/17  -     XR Ankles Bilateral 3 Or More Vws; Future; Expected date: 9/18/17    Rheumatoid factor positive  -     CCP Antibodies      HISTORY OF PRESENTING ILLNESS:  Wandy Tello, 52 y.o., female is here for evaluation of painful joints.  She is accompanied by her .  She used to work as a  now as a .  She recalls that it was in summer this year June, when she started hurting.  This was in her feet.  This is bilateral.  This was associated with swelling in the dorsum of the feet and she points to the metatarsophalangeal joints to denote the site of pain that she experienced back then.  By the time she went to her physician the pain had subsided somewhat.  It lasted around 4-6 weeks.  She was to be bothersome first thing  in the morning and the latter part of the day.  She had her labs drawn in part given the family history of rheumatoid arthritis in a paternal aunt.  She had a positive rheumatoid factor.  Her ANOOP, sed rate and CRP were negative.  Since then the pain in her feet has subsided significantly.  She noted pain in her knees, ankles.  This is troublesome first thing in the morning and the more she is up and about the worse this gets.  For example this time she was apprehensive and did not go to the state fair because of this concern.  She has not observed any significant swelling.  She gets occasional discomfort in the DIP joints and occasionally in the PIPs.  She has not had swelling in the dorsum of the hand, MCPs.  She has sisters history of psoriasis.  She reports herself otherwise to be in good health apart from history of Raynaud's.  She noted the pain level to be moderately severe.  She has history of fatigue, ringing in the ears, she gets nocturia, she has history of rash on her left upper extremity close to the forearm ulnar surface, dorsally.  That resolved spontaneously.  She has no history of smoking now having quit 5 months ago.  She takes 3-4 drinks of alcohol per week.  She has not had any joint surgeries.  She is currently on no regular nonsteroidals.   Further historical information and ADL limitations as noted in the multidimensional health assessment questionnaire attached in the EMR. Rest of the 13 system ROS is negative.     ALLERGIES:Ibuprofen and Pollen    PAST MEDICAL/ACTIVE PROBLEMS/MEDICATION/ FAMILY HISTORY/SOCIAL DATA:  The patient has a family history of  Past Medical History:   Diagnosis Date     Asthma      Fibrocystic breast      History   Smoking Status     Former Smoker     Packs/day: 0.75     Years: 5.00     Quit date: 4/28/2017   Smokeless Tobacco     Not on file     Patient Active Problem List   Diagnosis     Herpes Simplex Type II     Raynaud's Disease     Changed Sexual Interest  (Libido): Decreased     Moderate persistent asthma without complication     Nicotine dependence in remission     Current Outpatient Prescriptions   Medication Sig Dispense Refill     acetaminophen (TYLENOL EXTRA STRENGTH) 500 MG tablet Take 500 mg by mouth every 6 (six) hours as needed for pain.       albuterol (PROAIR HFA) 90 mcg/actuation inhaler Inhale 2 puffs every 6 (six) hours as needed for wheezing. 3 Inhaler 3     buPROPion (WELLBUTRIN XL) 150 MG 24 hr tablet Take 1 tablet (150 mg total) by mouth daily. 90 tablet 1     ergocalciferol (ERGOCALCIFEROL) 50,000 unit capsule Take 1 capsule (50,000 Units total) by mouth once a week for 12 doses. 12 capsule 0     fluticasone (FLONASE) 50 mcg/actuation nasal spray USE 2 SPRAYS IN EACH NOSTRIL DAILY 48 g 2     montelukast (SINGULAIR) 10 mg tablet Take 1 tablet (10 mg total) by mouth bedtime. 90 tablet 3     UNABLE TO FIND Med Name: Relief factor - suppliment       budesonide-formoterol (SYMBICORT) 160-4.5 mcg/actuation inhaler Inhale 2 puffs 2 (two) times a day. 3 Inhaler 3     predniSONE (DELTASONE) 20 MG tablet Two tabs by mouth daily fo 5 days. 10 tablet 0     valACYclovir (VALTREX) 500 MG tablet Take 1 tablet (500 mg total) by mouth 2 (two) times a day. 180 tablet 2     No current facility-administered medications for this visit.        COMPREHENSIVE EXAMINATION:  Vitals:    09/18/17 0806   BP: 124/62   Patient Site: Right Arm   Patient Position: Sitting   Cuff Size: Adult Regular   Pulse: 64   Weight: 150 lb 12.8 oz (68.4 kg)   Height: 5' (1.524 m)     A well appearing alert oriented female. Vital data as noted above. Her eyes without inflammation/scleromalacia. ENTwithout oral mucositis, thrush, nasal deformity, external ear redness, deformity. Her neck is without lymphadenopathy and supple. Lungs normal sounds, no pleural rub. Heart auscultation normal rate, rhythm; no pericardial rub and murmurs. Abdomen soft, non tender, no organomegaly. Skin examined for  heliotrope, malar area eruption, lupus pernio, periungual erythema, sclerodactyly, papules, erythema nodosum, purpura, nail pitting, onycholysis, and obvious psoriasis lesion. Neurological examination shows normal alertness, speech, facial symmetry, tone and power in upper and lower extremities, Tinel's and Phalen's at wrist and gait. The joint examination is performed for swelling, tenderness, warmth, erythema, and range of motion in the following joints: DIPs, PIPs, MCPs, wrists, first CMC's, elbows, shoulders, hips, knees, ankles, feet; spine for range of motion and paraspinal muscles for tenderness. The salient normal / abnormal findings are appended. No appreciable synovitis in any of the palpable appendicular joints.  Minimal tenderness in the DIPs.  JLT of the knees bilaterally without warmth or effusion popliteal cyst full range of motion.  No tenderness swelling around the ankle joints bilaterally.  No nail changes such as of psoriasis.  There is no dactylitis, enthesitis.  No sclerodactyly perianal erythema no malar area eruption.  There is no pleural pericardial rub.    LAB / IMAGING DATA:  ALT   Date Value Ref Range Status   02/06/2017 12 0 - 45 U/L Final     Albumin   Date Value Ref Range Status   02/06/2017 3.5 3.5 - 5.0 g/dL Final       WBC   Date Value Ref Range Status   08/29/2016 8.9 4.0 - 11.0 thou/uL Final     Hemoglobin   Date Value Ref Range Status   08/29/2016 14.7 12.0 - 16.0 g/dL Final     Platelets   Date Value Ref Range Status   08/29/2016 284 140 - 440 thou/uL Final       Lab Results   Component Value Date    RF 91.6 (H) 07/21/2017    SEDRATE 12 07/21/2017
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
## 2021-06-13 NOTE — PROGRESS NOTES
Pulmonary Clinic Follow Up    Cc: follow up Asthma    HPI: 51yoF with tobacco abuse (started age 45) following up for asthma.    ID: Care initiated 8/2015 when she was struggling with wheezing and humidity. After ICS/LABA symptoms significantly improved. She was initially NIOx 134(!) but decreased to 6 on therapy.      Stopped Smoking in May 2017(!) Breathing significantly improved since then and has been taking inhalers only on an intermittent basis.     ACT: previously 22:5+5+5+4+4=23      Her PFTs 8/2016 showed:  FEV1 1.76L, 72% predicted, improved to 84% predicted post bronchodilator, a 16% improvement  FVC 2.17L, 70% predicted  FEV1/FVC 81  Normal flow volume loop  TLC 4.26L, 94% predicted  DLCO 99% predicted    Repeat Spirometry performed today (personally reviewed)   FEV1 2.33L, 94% predicted  FVC 2.84L, 94% predicted  FEV1/FVC 82  Significantly improved, now normal spirometry    NIOx 16 off of ICS/LABA     Current Outpatient Prescriptions   Medication Sig     acetaminophen (TYLENOL EXTRA STRENGTH) 500 MG tablet Take 500 mg by mouth every 6 (six) hours as needed for pain.     albuterol (PROAIR HFA) 90 mcg/actuation inhaler Inhale 2 puffs every 6 (six) hours as needed for wheezing.     budesonide-formoterol (SYMBICORT) 160-4.5 mcg/actuation inhaler Inhale 2 puffs 2 (two) times a day.     buPROPion (WELLBUTRIN XL) 150 MG 24 hr tablet Take 1 tablet (150 mg total) by mouth daily.     ergocalciferol (ERGOCALCIFEROL) 50,000 unit capsule Take 1 capsule (50,000 Units total) by mouth once a week for 12 doses.     fluticasone (FLONASE) 50 mcg/actuation nasal spray USE 2 SPRAYS IN EACH NOSTRIL DAILY     montelukast (SINGULAIR) 10 mg tablet Take 1 tablet (10 mg total) by mouth bedtime.     predniSONE (DELTASONE) 20 MG tablet Two tabs by mouth daily fo 5 days.     terbinafine HCl (LAMISIL) 250 mg tablet TAKE 1 TABLET DAILY     valACYclovir (VALTREX) 500 MG tablet Take 1 tablet (500 mg total) by mouth 2 (two) times a day.      Vitals:    09/15/17 0806   BP: 110/72   Pulse: 81   Resp: 16   SpO2: 98%     RA  Gen: NAD  C/V: RRR, S1 and S2.  Resp: clear bilaterally. Did hear one small inspiratory wheeze at first breath but then clear afterwards.  Ext: no edema or clubbing.     ASSESSMENT/PLAN:  Asthma-mild intermittent currently  -Asthma Action Plan--prescribed prednisone for her in an emergency. Peak flow 325, significantly improved.  -Niox 16 now well controlled off if ICS/LABA  -Trial off of the Symbicort. Resume if symptoms return. Continue Singulair for now. In 6 months if still doing well can trial off of Singulair.  -Follow up in 6 months, sooner if needed    Tobacco abuse  -In remission, congratulations provided!    >50% of this 30 minute visit spent in direct patient counseling.   Marilyn Purcell MD  Electronically signed on 9/15/2017 8:42 AM
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
## 2021-06-14 NOTE — PROGRESS NOTES
ASSESSMENT:  1. Insomnia  traZODone (DESYREL) 100 MG tablet       PLAN:  Increase trazodone 200 mg nightly, discussed with patient that effectiveness of this medication is very dose dependent.  We may have to further adjust dose to find an effective dose for patient.  There are other options, however we discussed that they are habit forming and would like to avoid these if possible.  Encouraged good sleep hygiene as well.    No problem-specific Assessment & Plan notes found for this encounter.      There are no Patient Instructions on file for this visit.    No orders of the defined types were placed in this encounter.    Medications Discontinued During This Encounter   Medication Reason     traZODone (DESYREL) 50 MG tablet Reorder       No Follow-up on file.    CHIEF COMPLAINT:  Chief Complaint   Patient presents with     Insomnia     trouble sleeping-pt able to fall asleep easily, but wakes in the middle of the night and unable to fall back to sleep        HISTORY OF PRESENT ILLNESS:  Wandy is a 52 y.o. female today to discuss insomnia.  Patient has been having trouble for about a year but worse in the past 3 months.  Recently she is started on trazodone at 50 mg nightly.  She has not noticed much of an improvement in sleep since that time.  Has had a lot of stressful life events happening recently, so no this has had an effect but overall feels sleep has not been well for some time.  She typically does not have trouble falling asleep but will wake in the middle of the night be unable to fall back asleep.  Wondering what her options are for medication since the trazodone has not worked as well as she had hoped.  States that mood is under good control currently, given the recent stress in her life.    REVIEW OF SYSTEMS:      Pertinent items are noted in HPI.  All other systems are negative  PFSH:  Reviewed, no changes      TOBACCO USE:  History   Smoking Status     Former Smoker     Packs/day: 0.75     Years:  5.00     Quit date: 4/28/2017   Smokeless Tobacco     Not on file       VITALS:  Vitals:    11/22/17 1639   BP: 130/82   Patient Site: Left Arm   Patient Position: Sitting   Cuff Size: Adult Regular   Pulse: 88   Resp: 16   Temp: 98.2  F (36.8  C)   TempSrc: Oral   Weight: 153 lb (69.4 kg)     Wt Readings from Last 3 Encounters:   11/22/17 153 lb (69.4 kg)   10/18/17 150 lb (68 kg)   09/18/17 150 lb 12.8 oz (68.4 kg)       PHYSICAL EXAM:   /82 (Patient Site: Left Arm, Patient Position: Sitting, Cuff Size: Adult Regular)  Pulse 88  Temp 98.2  F (36.8  C) (Oral)   Resp 16  Wt 153 lb (69.4 kg)  BMI 28.91 kg/m2  General appearance: alert, appears stated age and cooperative  Lungs: clear to auscultation bilaterally  Heart: regular rate and rhythm, S1, S2 normal, no murmur, click, rub or gallop  Psychologic: Mood and affect normal.      DATA REVIEWED:  Additional History from Old Records Summarized (2): 0  Decision to Obtain Records (1): 0  Radiology Tests Summarized or Ordered (1): 0  Labs Reviewed or Ordered (1): 0  Medicine Test Summarized or Ordered (1): 0  Independent Review of EKG or X-RAY(2 each): 0    The visit lasted a total of 20 minutes face to face with the patient. Over 50% of the time was spent counseling and educating the patient about plan of care.    MEDICATIONS:  Current Outpatient Prescriptions   Medication Sig Dispense Refill     acetaminophen (TYLENOL EXTRA STRENGTH) 500 MG tablet Take 500 mg by mouth every 6 (six) hours as needed for pain.       albuterol (PROAIR HFA) 90 mcg/actuation inhaler Inhale 2 puffs every 6 (six) hours as needed for wheezing. 3 Inhaler 3     budesonide-formoterol (SYMBICORT) 160-4.5 mcg/actuation inhaler Inhale 2 puffs 2 (two) times a day. 3 Inhaler 3     buPROPion (WELLBUTRIN XL) 150 MG 24 hr tablet Take 2 tablets (300 mg total) by mouth daily. 180 tablet 1     fluticasone (FLONASE) 50 mcg/actuation nasal spray USE 2 SPRAYS IN EACH NOSTRIL DAILY 48 g 2      montelukast (SINGULAIR) 10 mg tablet Take 1 tablet (10 mg total) by mouth bedtime. 90 tablet 3     traZODone (DESYREL) 100 MG tablet Take 1 tablet (100 mg total) by mouth at bedtime. 90 tablet 1     UNABLE TO FIND Med Name: Relief factor - suppliment       valACYclovir (VALTREX) 500 MG tablet Take 1 tablet (500 mg total) by mouth 2 (two) times a day. 180 tablet 2     No current facility-administered medications for this visit.        This note has been dictated using voice recognition software. Any grammatical or context distortions are unintentional and inherent to the software

## 2021-06-14 NOTE — PATIENT INSTRUCTIONS - HE
Dear Wandy Tello,    Your symptoms show that you may have coronavirus (COVID-19). This illness can cause fever, cough and trouble breathing. Many people get a mild case and get better on their own. Some people can get very sick.    Because you also reported sore throat I would like to also test you for Strep Throat to determine if we need to treat you for that as well.    What should I do?  We would like to test you for Covid-19 virus and Strep Throat. I have placed orders for these tests.     For all employees or close contacts (except [place] and [employer] - see below), go to your [employer] home page and scroll down to the section that says  You have an appointment that needs to be scheduled  and click the large green button that says  Schedule Now  and follow the steps to find the next available opening.  It is important that when you are asked what the reason for your appointment is that you mention you need BOTH Covid and Strep tests.  tests.     If you are unable to complete these steps or if you cannot find any available times, please call [phone] to schedule employee testing.     [place] employees or close contacts, please call [phone].   [place] ([employer]) employees or close contacts call [phone].    Return to work/school/ guidance:  Please let your workplace manager and staffing office know when your quarantine ends     We can t give you an exact date as it depends on the above. You can calculate this on your own or work with your manager/staffing office to calculate this. (For example if you were exposed on 10/4, you would have to quarantine for 14 full days. That would be through 10/18. You could return on 10/19.)      If you receive a positive COVID-19 test result, follow the guidance of the those who are giving you the results. Usually the return to work is 10 (or in some cases 20 days from symptom onset.) If you work at [employer], you must also be cleared by  Employee Occupational Health and Safety to return to work.        If you receive a negative COVID-19 test result and did not have a high risk exposure to someone with a known positive COVID-19 test, you can return to work once you're free of fever for 24 hours without fever-reducing medication and your symptoms are improving or resolved.      If you receive a negative COVID-19 test and If you had a high risk exposure to someone who has tested positive for COVID-19 then you can return to work 14 days after your last contact with the positive individual    Note: If you have ongoing exposure to the covid positive person, this quarantine period may be more than 14 days. (For example, if you are continued to be exposed to your child who tested positive and cannot isolate from them, then the quarantine of 7-14 days can't start until your child is no longer contagious. This is typically 10 days from onset of the child's symptoms. So the total duration may be 17-24 days in this case.)    Sign up for [employer].   We know it's scary to hear that you might have COVID-19. We want to track your symptoms to make sure you're okay over the next 2 weeks. Please look for an email from [employer]--this is a free, online program that we'll use to keep in touch. To sign up, follow the link in the email you will receive. Learn more at [email or web address]    How can I take care of myself?    Get lots of rest. Drink extra fluids (unless a doctor has told you not to)    Take Tylenol (acetaminophen) or ibuprofen for fever or pain. If you have liver or kidney problems, ask your family doctor if it's okay to take Tylenol o ibuprofen    If you have other health problems (like cancer, heart failure, an organ transplant or severe kidney disease): Call your specialty clinic if you don't feel better in the next 2 days.    Know when to call 911. Emergency warning signs include:  o Trouble breathing or shortness of breath  o Pain  or pressure in the chest that doesn't go away  o Feeling confused like you haven't felt before, or not being able to wake up  o Bluish-colored lips or face    Where can I get more information?  [facility] - About COVID-19:   [email or web address]    CDC - What to Do If You're Sick:   www.cdc.gov/coronavirus/2019-ncov/about/steps-when-sick.html        January 13, 2021  RE:  Wandy Tello                                                                                                                  [address] [address]      To whom it may concern:    I evaluated Wandy Tello on 01/13/21. Wandy Tello should be excused from work/school.     They should let their workplace manager and staffing office know when their quarantine ends.    We can not give an exact date as it depends on the information below. They can calculate this on their own or work with their manager/staffing office to calculate this. (For example if they were exposed on 10/04, they would have to quarantine for 14 full days. That would be through 10/18. They could return on 10/19.)    Quarantine Guidelines:      If patient receives a positive COVID-19 test result, they should follow the guidance of those who are giving the results. Usually the return to work is 10 (or in some cases 20 days from symptom onset.) If they work at [facility], they must be cleared by Employee Occupational Health and Safety to return to work.        If patient receives a negative COVID-19 test result and did not have a high risk exposure to someone with a known positive COVID-19 test, they can return to work once they're free of fever for 24 hours without fever-reducing medication and their symptoms are improving or resolved.      If patient receives a negative COVID-19 test and if they had a high risk exposure to someone who has tested positive for COVID-19 then they can return to work 14 days after their last  contact with the positive individual    Note: If there is ongoing exposure to the covid positive person, this quarantine period may be longer than 14 days. (For example, if they are continually exposed to their child, who tested positive and cannot isolate from them, then the quarantine of 7-14 days can't start until their child is no longer contagious. This is typically 10 days from onset to the child's symptoms. So the total duration may be 17-24 days in this case.)    Sincerely,  Madi Cope PA-C
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
## 2021-06-16 PROBLEM — F32.1 MODERATE MAJOR DEPRESSION (H): Status: ACTIVE | Noted: 2021-02-25

## 2021-06-16 PROBLEM — J45.20 MILD INTERMITTENT ASTHMA WITHOUT COMPLICATION: Status: ACTIVE | Noted: 2018-07-25

## 2021-06-16 PROBLEM — R76.8 RHEUMATOID FACTOR POSITIVE: Status: ACTIVE | Noted: 2017-09-18

## 2021-06-16 PROBLEM — L40.50 PSORIATIC ARTHRITIS (H): Status: ACTIVE | Noted: 2019-02-18

## 2021-06-16 PROBLEM — F17.201 NICOTINE DEPENDENCE IN REMISSION: Status: ACTIVE | Noted: 2017-09-15

## 2021-06-16 PROBLEM — M06.9 RHEUMATOID ARTHRITIS (H): Status: ACTIVE | Noted: 2021-02-27

## 2021-06-16 PROBLEM — M25.50 POLYARTHRALGIA: Status: ACTIVE | Noted: 2017-09-18

## 2021-06-16 NOTE — TELEPHONE ENCOUNTER
Incoming call from patient, she's upset she couldn't get her shingles vaccine. Patient was told there was no order in her chart. Patient is on her way back home. Patient is wondering if Dr. Vazquez or Dr. Brand (whom she saw for her physical) could place the order and she can come in today.     Provider, please place order and route back - will reach out to patient to help schedule.

## 2021-06-16 NOTE — TELEPHONE ENCOUNTER
Spoke with patient, informed her that order has been placed and if she would like she can stop by this afternoon, otherwise we can reschedule her for another day. Advised to double check with insurance incase her coverage has changed. Pt understood

## 2021-06-16 NOTE — TELEPHONE ENCOUNTER
Called to get her scheduled for physical for 2021. Patient informed me that she will no longer be coming to us anymore, told me that she does not have a new provider yet but will not be back with [facility] again.

## 2021-06-17 NOTE — PROGRESS NOTES
Assessment/Plan:     1. Right wrist pain  Differential diagnosis includes carpal tunnel, osteoarthritis, gout, or arthritis.  Little concern for gout given there is no swelling or redness.  Will treat conservatively for possible carpal tunnel, although Tinel and Phalen testing negative.  Continue anti-inflammatories twice daily for pain.  Patient given a wrist brace to wear during the day, and especially at night to avoid nerve compression.  Deferred imaging today, as there was no specific injury.  Patient will follow-up with her primary care provider or rheumatologist with new/worsening pain.  Consider EMG.        Subjective:     Wandy Tello is a 53 y.o. female who presents right wrist and hand pain ×2 days.  Patient woke with pain on Sunday morning.  No known injury or fall.  The palmar wrist and hand feel swollen and stiff.  Her  strength has declined.  Patient denies any specific numbness/tingling in the hand or fingers.  She was initially having a hard time opening her hand, but symptoms improved after she took 2 Aleve.  Patient is right-hand dominant.  She works here at [employer] as a , and is on the computer most of the day.  Typing has been okay, but she has difficulty holding onto a pen.  She has some very slight pain to the medial right elbow.  No history of carpal tunnel.  Patient has previously followed with a rheumatologist due to her positive rheumatoid factor.  She previously had some foot pain and swelling.  Patient has been taking Aleve, which has been helpful.      The following portions of the patient's history were reviewed and updated as appropriate: allergies, current medications, past family history, past medical history, past social history, past surgical history and problem list.    Review of Systems  Pertinent items are noted in HPI.     Objective:     /68 (Patient Site: Right Arm, Patient Position: Sitting, Cuff Size: Adult Regular)  Pulse 84  Temp 97.7  F  "(36.5  C) (Oral)   Ht 5' 1\" (1.549 m)  Wt 159 lb 3.2 oz (72.2 kg)  LMP 12/08/2009  Breastfeeding? No  BMI 30.08 kg/m2    General Appearance: Alert, cooperative, no distress, appears stated age  Extremities: Right hand and wrist appear normal.  No acute deformity, swelling, redness, or ecchymosis.  Pain palpated to the palmar wrist.  No pain in the metatarsals or digits.  Gross sensation intact.   strength reduced as compared to the left.  Tinel and Phalen negative.      Liza Hernandez, NP-C    "

## 2021-06-17 NOTE — PATIENT INSTRUCTIONS - HE
Patient Instructions by Rivka Jones MD at 11/14/2019 11:50 AM     Author: Rivka Jones MD Service: -- Author Type: Physician    Filed: 11/14/2019 12:30 PM Encounter Date: 11/14/2019 Status: Addendum    : Rivka Jones MD (Physician)    Related Notes: Original Note by Rivka Jones MD (Physician) filed at 11/14/2019 12:30 PM       1. Wash your hand after touching your eyes  2. Avoid any other creams, ointment or eye make up to right eye until healing complete  3. If follow up is needed, try and be seen at your primary clinic. Or you can be seen by any primary care provider at one of our other [facility] sites  4. If you have any questions, call the clinic number - the number is answered 24/7      Patient Education     Blepharitis    Blepharitis is an inflammation of the eyelid. It results in swelling of the eyelids, and it is usually caused by a bacterial infection or a skin condition. Blepharitis is a common eye condition. There are two types. Anterior blepharitis occurs where the eyelashes are attached (outside front edge of the eye). Posterior blepharitis affects the inner edge of the eyelid that touches the eyeball.  In addition to swollen eyelids, symptoms of blepharitis can include thick, yellow, dandruff-like scales that stick to the eyelid. There may be oily patches on the eyelid. The eyelashes may be crusted (with dandruff-like scales) when you wake up from sleeping. The irritated area may itch. The eyelids may be red. The eyes can be red and burn or sting. The eyes may tear a lot, or be dry. You can become sensitive to light or have blurred vision. Symptoms of blepharitis can cause irritability.  Blepharitis is a chronic condition and difficult to cure. Even with successful treatment, recurrences are common. Good hygiene and home treatments (in the Home care section below) can improve your condition.  Causes  Causes of blepharitis may include:    Problems with the oil glands in the  eyelid (meibomian glands)    Dandruff of the scalp and eyebrows (seborrheic dermatitis)    Acne rosacea (a skin condition that causes redness of the face, and other symptoms)    Eyelash mites (tiny organisms in the eyelash follicles)    Allergic reactions to cosmetics or medicines  Home care  Medicine: The healthcare provider may prescribe an antibiotic eye drops or ointment, artificial tears, and/or steroid eye drops. Follow all instructions for using these medicines. Use all medicines as directed. If you have pain, take medicine as advised by the healthcare provider.    Wash your hands carefully with soap and warm water before and after caring for your eyes.    Apply a warm compress or a warm, moist washcloth to the eyelids for 1 minute, 2 to 3 times a day, to loosen the crust. Then, wipe away scales or crust from the eyelids.    After applying the warm compress, gently scrub the base of the eyelashes for almost 15 seconds per eyelid. To do this, close your eyes and use a moist eyelid cleansing wipe, clean washcloth, or cotton swab. Ask your healthcare provider about products (such as nonirritating baby shampoo) to use to help clean the eyelids.    You may be instructed to gently massage your eyelids to help unblock the eyelid glands. Follow all instructions given by the healthcare provider.    Unless told otherwise, on a regular basis, with eyes closed, clean your eyelids as directed by the healthcare provider. Blepharitis can be an ongoing problem.    Do not wear eye makeup until the inflammation goes away, or as directed by your healthcare provider.    Unless told otherwise, stop using contact lenses until you complete treatment for the condition.    Wash your hands regularly to help prevent dirt and bacteria from coming in contact with your eyelid.  Follow-up care  Follow up with your healthcare provider, or as advised. Your healthcare provider may refer you to an eye specialist (an optometrist or  ophthalmologist) for further evaluation and treatment.  When to seek medical advice  Call your healthcare provider right away if any of these occur:    Increase in redness of the white part of the eye    Increase in swelling, redness, irritation, or pain of the eyelids    Eye pain    Change in vision (trouble seeing or blurring)    Drainage (pus, blood) from the eyelid    Fever of 100.4 F (38 C) or higher, or as directed by your healthcare provider  Date Last Reviewed: 10/9/2015    7711-7635 [employer]. [address] [place], [place] [address]. All rights reserved. This information is not intended as a substitute for professional medical care. Always follow your healthcare professional's instructions.

## 2021-06-18 NOTE — LETTER
Letter by Lakeisha Mon FNP at      Author: Lakeisha Mon FNP Service: -- Author Type: --    Filed:  Encounter Date: 2/6/2019 Status: (Other)           February 6, 2019        Wandy Tello  [address] [address]        Dear Wandy,    Breast cancer is the most common type of cancer among American women. The earlier breast cancer is detected, the better the survival rate. Your best defense against breast cancer is having a screening mammogram on a regular basis. The American Cancer Society recommends that women with an average risk of breast cancer should undergo regular screening mammography starting at age 45 years.         Women aged 45 to 54 years should have a mammogram annually.     Women 55 years and older should have a mammogram at least every other year.     There may be important reasons for you to follow a more frequent screening plan or an earlier start for breast cancer screening, so please discuss your health history and your family health history with your primary care provider.       We reviewed our records and we do not see that you had a mammogram within the past two years. If you have not had a mammogram in the past two years, we strongly encourage you to call for your appointment today. For your convenience, we have included a phone number below for you to schedule your mammogram at a nearby [facility] location.      If you have had a recent mammogram or have questions about why you are receiving this letter, please contact your primary care clinic at [phone] or send a [employer] [facility] message  ([email or web address]). Your clinic will answer any questions or help obtain recent mammogram reports for your chart at [facility] so we can keep record of your preventive care.       Sincerely,    Yaya Vazquez MD    and your primary care team at [facility]  Schedule your mammogram today at one of our 7 locations  Please call   [phone]

## 2021-06-18 NOTE — PROGRESS NOTES
10/05/19 0758   Patient Assessment/Suction   All Lung Fields Breath Sounds Anterior:;Lateral:;coarse;rhonchi   Suction Method tracheal   $ Suction Charges Inline Suction Procedure Stat Charge   Secretions Amount small;moderate   Secretions Color yellow   Secretions Characteristics thick   Aspirate Toleration TIFFANIE (no adverse reactions)   PRE-TX-O2   O2 Device (Oxygen Therapy) ventilator   $ Is the patient on Low Flow Oxygen? Yes   Oxygen Concentration (%) 30   SpO2 (!) 93 %   Pulse Oximetry Type Continuous   Pulse 83   Resp (!) 21   ETCO2   $ ETCO2 Charge Exhaled CO2 Monitoring   $ ETCO2 Usage Currently wearing   ETCO2 (mmHg) 32 mmHg   ETCO2 Device Type Bedside Monitor        Surgical Airway Portex Cuffed;Fenestrated   No Placement Date or Time found.   Present Prior to Hospital Arrival?: Yes  Inserted by: Present Prior to Hospital Arrival  Type: Tracheostomy  Brand: Portex  Airway Device Size: 8.0  Style: Cuffed;Fenestrated   Cuff Pressure 35 cm H2O   Cuff Inflation? Inflated   Status Secured   Site Assessment Drainage   Site Care Cleansed;Dressing applied   Ties Assessment Dry;Intact;Secure   Vent Select   Conventional Vent Y   $ Ventilator Subsequent 1   Charged w/in last 24h YES   Preset Conventional Ventilator Settings   Vent Type    Ventilation Type VC   Vent Mode A/C   Humidity HME   Set Rate 16 bmp   Vt Set 700 mL   PEEP/CPAP 7 cmH20   Pressure Support 0 cmH20   Waveform RAMP   Peak Flow 62 L/min   Set Inspiratory Pressure 0 cmH20   Insp Time 0 Sec(s)   Plateau Set/Insp. Hold (sec) 0   Insp Rise Time  0 %   Trigger Sensitivity Flow/I-Trigger 1.5 L/min   P High 0 cm H2O   P Low 0 cm H2O   T High 0 sec   T Low 0 sec   Patient Ventilator Parameters   Resp Rate Total 18 br/min   Peak Airway Pressure 35 cmH2O   Mean Airway Pressure 17 cmH20   Plateau Pressure 28 cmH20   Exhaled Vt 717 mL   Total Ve 10.8 mL   Spont Ve 0 L   I:E Ratio Measured 1:1.70   Conventional Ventilator Alarms   Alarms On Y   Ve High  ASSESSMENT/PLAN  1. Achilles tendinitis of both lower extremities  Discussed partial immobilization to help facilitate healing process  Discussed stretching exercises  Discussed proper shoe support  If not showing improvement in the upcoming weeks to contact me at clinic    2. Plantar fasciitis  Discussed stretching  Discussed use of a golf ball or tennis ball to help facilitate stretching while not standing  Discussed proper shoe and arch support  Discussed inserts  Patient will contact me if not improving    3. Class 1 obesity without serious comorbidity with body mass index (BMI) of 30.0 to 30.9 in adult, unspecified obesity type  Patient is concerned with her difficulty with weight loss  Discussed diet and exercise and decreasing carbohydrates in the diet  - HM2(CBC w/o Differential)  - Thyroid Cascade        SUBJECTIVE:   Chief Complaint   Patient presents with     Foot Pain     pain in both feet for the last year     Wandy Tello 53 y.o. female    Current Outpatient Prescriptions   Medication Sig Dispense Refill     acetaminophen (TYLENOL EXTRA STRENGTH) 500 MG tablet Take 500 mg by mouth every 6 (six) hours as needed for pain.       albuterol (PROAIR HFA) 90 mcg/actuation inhaler Inhale 2 puffs every 6 (six) hours as needed for wheezing. 3 Inhaler 3     buPROPion (WELLBUTRIN XL) 150 MG 24 hr tablet Take 2 tablets (300 mg total) by mouth daily. 180 tablet 1     fluticasone (FLONASE) 50 mcg/actuation nasal spray USE 2 SPRAYS IN EACH NOSTRIL DAILY 48 g 2     fluticasone-salmeterol (ADVAIR) 500-50 mcg/dose DISKUS Inhale 1 puff 2 (two) times a day. 60 each 11     montelukast (SINGULAIR) 10 mg tablet Take 1 tablet (10 mg total) by mouth at bedtime. 30 tablet 6     traZODone (DESYREL) 100 MG tablet Take 1 tablet (100 mg total) by mouth at bedtime. 90 tablet 1     UNABLE TO FIND Med Name: Relief factor - suppliment       valACYclovir (VALTREX) 500 MG tablet Take 1 tablet (500 mg total) by mouth 2 (two) times a  "day. 180 tablet 2     No current facility-administered medications for this visit.      Allergies: Ibuprofen and Pollen   Patient's last menstrual period was 12/08/2009.    HPI:   Patient is here to discuss pain in both her ankles and her feet bilaterally for greater than 1 year as well as trouble with weight loss.  Patient placed the Achilles area and bilateral feet to give her problems when ambulating did not give her problems when at rest-she also has pain throughout her plantar fascia area to bilateral feet with ambulation as well again improved with rest.  Discussed the diagnosis of Achilles tendinopathy and plantar fasciitis with the patient today.  She does suffer from pes planus bilaterally she has been trying to work on getting her shoe support and inserts but has not really been showing to have improvement of her symptoms.  Discussed with her the chronic nature of plantar fasciitis and pes planus and the need to work on arch support anytime that she is up and ambulating this includes when she comes home and she is looking to having house shoes so she can always have arch support  Discussed stretching techniques to help with both the Achilles and plantar fasciitis  Patient is been concentrated on weight loss and has been having difficulty obtaining her goal  We discussed diet and exercise  Discussed carbohydrate reduction in her diet  Discussed checking blood levels for thyroid and anemia      ROS: negative except as per HPI    OBJECTIVE:   The patient appears well, alert, oriented x 3, in no distress.  /70  Pulse 84  Resp 16  Ht 5' 0.25\" (1.53 m)  Wt 156 lb 6.4 oz (70.9 kg)  LMP 12/08/2009  BMI 30.29 kg/m2    Lungs: clear, good air entry, no wheezes, rhonchi or rales.   Cardiac: S1 and S2 normal, no murmurs, regular rate and rhythm.   Muscular: Patient has pes planus bilaterally, area of irritation the plantar fascia as well as bilateral Achilles-no inflammation or swelling noted no erythema or " Alarm 16 L/min   Resp Rate High Alarm 40 br/min   Press High Alarm 55 cmH2O   Apnea Rate 16   Apnea Volume (mL) 700 mL   Apnea Oxygen Concentration  100   Apnea Flow Rate (L/min) 60   T Apnea 20 sec(s)     With trach care BID   signs of infection  Extremities: show no edema, normal peripheral pulses.   Neurological: normal, no focal findings.  Skin: clear, dry, no rashes/lesions  Psych- normal mood and affect      Pt states an understanding and agrees to the above plan.  Greater than 25 minutes was spent today in interview and examination with Wandy Tello with more than 50% of that time in counseling and coordination of care.

## 2021-06-18 NOTE — PROGRESS NOTES
Wandy called with complaint of increased shortness of breath, cough and some wheezing. Said she could only blow to 200  On her peak flow. Requesting a refill of her inhalers. Inhalers refilled and prednisone 40 mg x 5 days ordered per her asthma action plan.  Instructed to call on Monday with an update and if symptoms get worse to go to the Ed to be evaluated.

## 2021-06-19 NOTE — PROGRESS NOTES
Pulmonary Clinic Follow Up    Cc: follow up Asthma    HPI: 51yoF with tobacco abuse (started age 45) following up for asthma.    ID: Care initiated 8/2015 when she was struggling with wheezing and humidity. After ICS/LABA symptoms significantly improved. She was initially NIOx 134(!) but decreased to 6 on therapy.      Stopped Smoking in May 2017(!) Breathing significantly improved since then and has been taking inhalers only on an intermittent basis.     Had stopped all meds because doing so well after not smoking, then in the spring had some more difficulty in setting of grass probable allergy and resumed the symbicort and singulair and breathing improved significantly. Now weaning back off. Usually peak flow at 350, dropped to 240. Got prednisone but never had to take it at the time.     ACT: previously 23: 5+4+5+4+4=22      ROS: 12-point ROS performed and notable for: leg swelling in setting of NSAIDs, shortness of breath, wheezing, weight gain and rash.   Current Outpatient Prescriptions   Medication Sig Note     acetaminophen (TYLENOL EXTRA STRENGTH) 500 MG tablet Take 500 mg by mouth every 6 (six) hours as needed for pain. 9/18/2017: As needed     albuterol (PROAIR HFA) 90 mcg/actuation inhaler Inhale 2 puffs every 6 (six) hours as needed for wheezing.      buPROPion (WELLBUTRIN XL) 150 MG 24 hr tablet Take 2 tablets (300 mg total) by mouth daily.      fluticasone (FLONASE) 50 mcg/actuation nasal spray USE 2 SPRAYS IN EACH NOSTRIL DAILY      fluticasone-salmeterol (ADVAIR) 500-50 mcg/dose DISKUS Inhale 1 puff 2 (two) times a day.      montelukast (SINGULAIR) 10 mg tablet Take 1 tablet (10 mg total) by mouth at bedtime.      traZODone (DESYREL) 100 MG tablet Take 1 tablet (100 mg total) by mouth at bedtime.      UNABLE TO FIND Med Name: Relief factor - suppliment      valACYclovir (VALTREX) 500 MG tablet Take 1 tablet (500 mg total) by mouth 2 (two) times a day.      Vitals:    07/25/18 1502   BP: 100/68    Pulse: 76   Resp: 20   SpO2: 97%   RA  Gen: NAD  C/V: RRR, S1 and S2.  Resp: clear bilaterally.   Ext: no edema or clubbing.     ASSESSMENT/PLAN:  Asthma-mild intermittent   -Asthma Action Plan--prescribed prednisone for her in an emergency. Peak flow 350 at baseline(!)  -Symbicort and Singulair in fall and spring and off in winter and summer if asymptomatic.   -Follow up 1 year, call sooner if you need us.     Tobacco abuse  -still not smoking, working on weight loss.     >50% of this 30 minute visit spent in direct patient counseling.   Marilyn Purcell MD  Electronically signed on 7/25/2018 8:42 AM

## 2021-06-21 NOTE — PROGRESS NOTES
Assessment:     1. Vomiting and diarrhea     2. Stomach discomfort          Acute Gastroenteritis      Plan:      1. Discussed oral rehydration, reintroduction of solid foods, signs of dehydration.  2. Return or go to emergency department if worsening symptoms, blood or bile, signs of dehydration, diarrhea lasting longer than 5 days or any new concerns.  3. Follow up as needed.    4.  If symptoms persist, please let me know via my chart and I will try to do LFTs and possible H pylori for stomach symptoms    Subjective:       Wandy Tello is a 53 y.o. female who presents for evaluation of nonbilious vomiting 3 times per day. Symptoms have been present for 3 days. Patient denies blood in stool, constipation, fever and heartburn. Patient's oral intake has been normal for liquids and decreased for solids. Patient's urine output has been adequate. Other contacts with similar symptoms include: NONE. Patient denies recent travel history. Patient has not had recent ingestion of possible contaminated food, toxic plants, or inappropriate medications/poisons.     Vomiting and diarrhea on Friday. That day had large emesis and then dry heaves.  NO vomiting on Saturday or Sunday.  Today has mushy stool. No vomiting.     Feels full in stomach.    Today had Chicken sandwich and fries.  Please like food is still sitting there.    She recently started working out at [facility] early in the morning for the last 2 1/2 weeks.    The following portions of the patient's history were reviewed and updated as appropriate: allergies, current medications, past family history, past medical history, past social history, past surgical history and problem list.  Allergies   Allergen Reactions     Ibuprofen      Pollen        Current Outpatient Prescriptions on File Prior to Visit   Medication Sig Dispense Refill     albuterol (PROAIR HFA) 90 mcg/actuation inhaler Inhale 2 puffs every 6 (six) hours as needed for wheezing. 3 Inhaler 3      budesonide-formoterol (SYMBICORT) 160-4.5 mcg/actuation inhaler Inhale 2 puffs 2 (two) times a day. 3 Inhaler 3     buPROPion (WELLBUTRIN XL) 150 MG 24 hr tablet Take 2 tablets (300 mg total) by mouth daily. 180 tablet 1     fluticasone (FLONASE) 50 mcg/actuation nasal spray 2 sprays into each nostril daily. 48 g 2     valACYclovir (VALTREX) 500 MG tablet Take 1 tablet (500 mg total) by mouth 2 (two) times a day. 180 tablet 2     acetaminophen (TYLENOL EXTRA STRENGTH) 500 MG tablet Take 500 mg by mouth every 6 (six) hours as needed for pain.       montelukast (SINGULAIR) 10 mg tablet Take 1 tablet (10 mg total) by mouth at bedtime. 90 tablet 3     traZODone (DESYREL) 100 MG tablet Take 1 tablet (100 mg total) by mouth at bedtime. 90 tablet 1     No current facility-administered medications on file prior to visit.        Patient Active Problem List   Diagnosis     Herpes Simplex Type II     Raynaud's Disease     Changed Sexual Interest (Libido): Decreased     Nicotine dependence in remission     Polyarthralgia     Rheumatoid factor positive     Mild intermittent asthma without complication       Past Medical History:   Diagnosis Date     Asthma        Past Surgical History:   Procedure Laterality Date     HAND / FINGER TENDON LESION EXCISION      Recorded: 04/14/2014;     HYSTERECTOMY  2009     LIPECTOMY      Description: Lipectomy Of Thigh;  Recorded: 04/14/2014;     SEPTOPLASTY      Recorded: 04/14/2014;     TONSILLECTOMY AND ADENOIDECTOMY      Recorded: 04/14/2014;     VAGINAL HYSTERECTOMY      Recorded: 04/14/2014;  Comments: for fibroids       Family History   Problem Relation Age of Onset     Hyperlipidemia Mother      Glaucoma Mother      Hyperlipidemia Father      COPD Father      non-smoker     Glaucoma Father      Diabetes type II Father      Obesity Sister      Asthma Daughter      Hospitalized in the past due to asthma       Social History     Social History     Marital status:      Spouse  name: N/A     Number of children: N/A     Years of education: N/A     Social History Main Topics     Smoking status: Former Smoker     Packs/day: 0.75     Years: 5.00     Quit date: 4/28/2017     Smokeless tobacco: Never Used     Alcohol use 0.0 oz/week     4 - 6 Standard drinks or equivalent per week     Drug use: No     Sexual activity: Yes     Other Topics Concern     None     Social History Narrative    She works at the [employer] contraception for 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        Pricilla Nguyen MD  10/22/2018               Review of Systems  Constitutional: negative  Cardiovascular: negative  Genitourinary:negative  Integument/breast: negative      Objective:     /88 (Patient Site: Left Arm, Patient Position: Sitting, Cuff Size: Adult Regular)  Pulse 67  Temp 97.1  F (36.2  C) (Oral)   Resp 16  Wt 153 lb 12.8 oz (69.8 kg)  LMP 12/08/2009  SpO2 98%  BMI 29.79 kg/m2    General:Healthy, alert and in no acute distress  Head:  NCAT w/o lesions or tenderness  Eyes: conjunctivae/corneas clear.  Ears: normal TM's and external ear canals bilateral  Mouth: lips, mucosa, and tongue normal. Teeth and gums normal. No tonsillar endangerment ,Mild erythema of pharynx  Neck: supple, symmetrical, trachea midline.  Lungs: clear to auscultation bilaterally  Heart: RRR, No murmurs  Abdomen: Soft NTND, No HSM, No peritoneal signs, Rebound negative,  Skin: No rashes

## 2021-06-21 NOTE — LETTER
Letter by Casandra Brand MD at      Author: Casandra Brand MD Service: -- Author Type: --    Filed:  Encounter Date: 2/25/2021 Status: (Other)         February 27, 2021     Patient: Wandy Tello   YOB: 1965   Date of Visit: 2/25/2021       To Whom it May Concern:    Wandy Tello was seen in my clinic on 2/25/2021.    If you have any questions or concerns, please don't hesitate to call.    Sincerely,         Electronically signed by Casandra Brand MD

## 2021-06-21 NOTE — LETTER
Letter by Casandra Brand MD at      Author: Casandra Brand MD Service: -- Author Type: --    Filed:  Encounter Date: 2/25/2021 Status: (Other)       My Asthma Action Plan     Name: Wandy Tello   YOB: 1965  Date: 2/27/2021   My doctor: Casandra Brand MD   My clinic: [facility]        My Rescue Medicine:   Albuterol (Proair/Ventolin/Proventil HFA) 2-4 puffs EVERY 4 HOURS as needed. Use a spacer if recommended by your provider.   My Asthma Severity:   Intermittent/Exercise Induced  Know your asthma triggers: smoke             GREEN ZONE   Good Control    I feel good    No cough or wheeze    Can work, sleep and play without asthma symptoms     Take your asthma control medicine every day.     1. If exercise triggers your asthma, take your rescue medication    15 minutes before exercise or sports, and    During exercise if you have asthma symptoms  2. Spacer to use with inhaler: If you have a spacer, make sure to use it with your inhaler             YELLOW ZONE Getting Worse  I have ANY of these:    I do not feel good    Cough or wheeze    Chest feels tight    Wake up at night 1. Keep taking your Green Zone medications  2. Start taking your rescue medicine:    every 20 minutes for up to 1 hour. Then every 4 hours for 24-48 hours.  3. If you stay in the Yellow Zone for more than 12-24 hours, contact your doctor.  4. If you do not return to the Green Zone in 12-24 hours or you get worse, start taking your oral steroid medicine if prescribed by your provider.           RED ZONE Medical Alert - Get Help  I have ANY of these:    I feel awful    Medicine is not helping    Breathing getting harder    Trouble walking or talking    Nose opens wide to breathe     1. Take your rescue medicine NOW  2. If your provider has prescribed an oral steroid medicine, start taking it NOW  3. Call your doctor NOW  4. If you are still in the Red Zone after 20 minutes and you have not  reached your doctor:    Take your rescue medicine again and    Call 911 or go to the emergency room right away    See your regular doctor within 2 weeks of an Emergency Room or Urgent Care visit for follow-up treatment.          Annual Reminders:  Meet with Asthma Educator,  Flu Shot in the Fall, consider Pneumonia Vaccination for patients with asthma (aged 19 and older).    Pharmacy:   [id number] - [place], [place] - [address] [place] [address] & [facility]  [address]  [facility] [address]  Phone: [phone] Fax: [phone]    [facility] - [place], [place] - [address] [place] [address]  [address] [facility] [address]  Phone: [phone] Fax: [phone]    [facility] - [place], [place] - [address] [place], [place] [address] [facility] & [facility] [address]  Phone: [phone] Fax: [phone]      Electronically signed by Casandra Brand MD   Date: 02/27/21                      Asthma Triggers  How To Control Things That Make Your Asthma Worse    Triggers are things that make your asthma worse.  Look at the list below to help you find your triggers and what you can do about them.  You can help prevent asthma flare-ups by staying away from your triggers.      Trigger                                                          What you can do   Cigarette Smoke  Tobacco smoke can make asthma worse. Do not allow smoking in your home, car or around you.  Be sure no one smokes at a frances day care or school.  If you smoke, ask your health care provider for ways to help you quit.  Ask family members to quit too.  Ask your health care provider for a referral to Quit Plan to help you quit smoking, or call 6-562-438-PLAN.     Colds, Flu, Bronchitis  These are common triggers of asthma. Wash your hands often.  Dont touch your eyes, nose or mouth.  Get a flu shot every year.     Dust Mites  These are tiny bugs that live in cloth or carpet.  They are too small to see. Wash sheets and blankets in hot water every week.   Encase pillows and mattress in dust mite proof covers.  Avoid having carpet if you can. If you have carpet, vacuum weekly.   Use a dust mask and HEPA vacuum.   Pollen and Outdoor Mold  Some people are allergic to trees, grass, or weed pollen, or molds. Try to keep your windows closed.  Limit time out doors when pollen count is high.   Ask you health care provider about taking medicine during allergy season.     Animal Dander  Some people are allergic to skin flakes, urine or saliva from pets with fur or feathers. Keep pets with fur or feathers out of your home.    If you cant keep the pet outdoors, then keep the pet out of your bedroom.  Keep the bedroom door closed.  Keep pets off cloth furniture and away from stuffed toys.     Mice, Rats, and Cockroaches  Some people are allergic to the waste from these pests.   Cover food and garbage.  Clean up spills and food crumbs.  Store grease in the refrigerator.   Keep food out of the bedroom.   Indoor Mold  This can be a trigger if your home has high moisture. Fix leaking faucets, pipes, or other sources of water.   Clean moldy surfaces.  Dehumidify basement if it is damp and smelly.   Smoke, Strong Odors, and Sprays  These can reduce air quality. Stay away from strong odors and sprays, such as perfume, powder, hair spray, paints, smoke incense, paint, cleaning products, candles and new carpet.   Exercise or Sports  Some people with asthma have this trigger. Be active!  Ask your doctor about taking medicine before sports or exercise to prevent symptoms.    Warm up for 5-10 minutes before and after sports or exercise.     Other Triggers of Asthma  Cold air:  Cover your nose and mouth with a scarf.  Sometimes laughing or crying can be a trigger.  Some medicines and food can trigger asthma.

## 2021-06-23 NOTE — PROGRESS NOTES
ASSESSMENT:  1. Health maintenance examination  CANCELED: Lipid Gilliam FASTING    CANCELED: Comprehensive Metabolic Panel    CANCELED: Thyroid Cascade    CANCELED: Vitamin B12   2. Genital herpes  valACYclovir (VALTREX) 500 MG tablet   3. Visit for screening mammogram  Mammo Screening Bilateral   4. Fatigue, unspecified type  CANCELED: Rheumatoid Factor Quant    CANCELED: Antinuclear Antibody (ANOOP) Cascade    CANCELED: CCP Antibodies    CANCELED: Sedimentation Rate   5. Insomnia, unspecified type         PLAN:  Refills provided as needed. Discussed trying trazodone again for sleep. Patient to titrate up on dose, discussed. Labs to evaluate fatigue. Follow results and determine plan based on these. Discussed that likely menopause could be playing a part in these changes. Mammogram order, follow up as needed or next year for physical.  No problem-specific Assessment & Plan notes found for this encounter.    The following high BMI interventions were performed this visit: encouragement to exercise and weight monitoring  There are no Patient Instructions on file for this visit.    Orders Placed This Encounter   Procedures     Mammo Screening Bilateral     Standing Status:   Future     Standing Expiration Date:   4/23/2020     Order Specific Question:   Patient's previous breast density:     Answer:   Heterogeneously dense [3]     Order Specific Question:   Is the patient pregnant?     Answer:   No     Order Specific Question:   Can the procedure be changed per Radiologist protocol?     Answer:   Yes     Medications Discontinued During This Encounter   Medication Reason     buPROPion (WELLBUTRIN XL) 150 MG 24 hr tablet Reorder     valACYclovir (VALTREX) 500 MG tablet Reorder       No Follow-up on file.    CHIEF COMPLAINT:  Chief Complaint   Patient presents with     Annual Exam     pt is not fasting, weight and issues sleeping         HISTORY OF PRESENT ILLNESS:  Wandy is a 53 y.o. female here today for physical.  "Complaints include fatigue, insomnia, needs refill for valtrex. Would like to do some lab work to evaluate fatigue today. She thinks menopause may be playing a role in fatigue and insomnia. Would like some recommendations today. Due for mammogram, not fasting for labs but will return.    REVIEW OF SYSTEMS:      Pertinent items are noted in HPI.  All other systems are negative  PFSH:  Reviewed, no changes      TOBACCO USE:  Social History     Tobacco Use   Smoking Status Former Smoker     Packs/day: 0.75     Years: 5.00     Pack years: 3.75     Last attempt to quit: 2017     Years since quittin.7   Smokeless Tobacco Never Used       VITALS:  Vitals:    19 1610   BP: 114/72   Patient Site: Right Arm   Patient Position: Sitting   Cuff Size: Adult Large   Pulse: 83   Resp: 16   Temp: 97.6  F (36.4  C)   TempSrc: Oral   Weight: 154 lb 6 oz (70 kg)   Height: 5' 0.25\" (1.53 m)     Wt Readings from Last 3 Encounters:   19 154 lb 6 oz (70 kg)   10/22/18 153 lb 12.8 oz (69.8 kg)   18 154 lb 14.4 oz (70.3 kg)       PHYSICAL EXAM:   /72 (Patient Site: Right Arm, Patient Position: Sitting, Cuff Size: Adult Large)   Pulse 83   Temp 97.6  F (36.4  C) (Oral)   Resp 16   Ht 5' 0.25\" (1.53 m)   Wt 154 lb 6 oz (70 kg)   LMP 2009   BMI 29.90 kg/m    General appearance: alert, appears stated age and cooperative  Head: Normocephalic, without obvious abnormality, atraumatic  Eyes: conjunctivae/corneas clear. PERRL, EOM's intact. Fundi benign.  Ears: normal TM's and external ear canals both ears  Nose: Nares normal. Septum midline. Mucosa normal. No drainage or sinus tenderness.  Throat: lips, mucosa, and tongue normal; teeth and gums normal  Lungs: clear to auscultation bilaterally  Breasts: normal appearance, no masses or tenderness  Heart: regular rate and rhythm, S1, S2 normal, no murmur, click, rub or gallop  Abdomen: soft, non-tender; bowel sounds normal; no masses,  no " organomegaly  Extremities: extremities normal, atraumatic, no cyanosis or edema  Pulses: 2+ and symmetric  Skin: Skin color, texture, turgor normal. No rashes or lesions  Neurologic: Grossly normal    DATA REVIEWED:  Additional History from Old Records Summarized (2): previous physical  Decision to Obtain Records (1): 0  Radiology Tests Summarized or Ordered (1): 0  Labs Reviewed or Ordered (1): 1  Medicine Test Summarized or Ordered (1): 0  Independent Review of EKG or X-RAY(2 each): 0    The visit lasted a total of 40 minutes face to face with the patient. Over 50% of the time was spent counseling and educating the patient about plan of care, follow up.    MEDICATIONS:  Current Outpatient Medications   Medication Sig Dispense Refill     acetaminophen (TYLENOL EXTRA STRENGTH) 500 MG tablet Take 500 mg by mouth every 6 (six) hours as needed for pain.       albuterol (PROAIR HFA) 90 mcg/actuation inhaler Inhale 2 puffs every 6 (six) hours as needed for wheezing. 3 Inhaler 3     budesonide-formoterol (SYMBICORT) 160-4.5 mcg/actuation inhaler Inhale 2 puffs 2 (two) times a day. 3 Inhaler 3     buPROPion (WELLBUTRIN XL) 150 MG 24 hr tablet Take 2 tablets (300 mg total) by mouth daily. 180 tablet 3     fluticasone (FLONASE) 50 mcg/actuation nasal spray 2 sprays into each nostril daily. 48 g 2     traZODone (DESYREL) 100 MG tablet Take 1 tablet (100 mg total) by mouth at bedtime. 90 tablet 1     valACYclovir (VALTREX) 500 MG tablet Take 1 tablet (500 mg total) by mouth 2 (two) times a day. 180 tablet 3     montelukast (SINGULAIR) 10 mg tablet Take 1 tablet (10 mg total) by mouth at bedtime. 90 tablet 3     traZODone (DESYREL) 50 MG tablet Take 1 tablet (50 mg total) by mouth at bedtime. 90 tablet 1     No current facility-administered medications for this visit.        This note has been dictated using voice recognition software. Any grammatical or context distortions are unintentional and inherent to the software

## 2021-06-26 ENCOUNTER — HEALTH MAINTENANCE LETTER (OUTPATIENT)
Age: 56
End: 2021-06-26

## 2021-06-30 NOTE — PROGRESS NOTES
Progress Notes by Casandra Brand MD at 2/25/2021  3:30 PM     Author: Casandra Brand MD Service: -- Author Type: Physician    Filed: 2/27/2021  9:47 PM Encounter Date: 2/25/2021 Status: Signed    : Casandra Brand MD (Physician)       FEMALE PREVENTATIVE EXAM    Assessment and Plan:     Patient has been advised of split billing requirements and indicates understanding: Yes    Wandy was seen today for annual exam.    Encounter for screening and preventative care  Immunizations reviewed --- needs shingrix.    Colonosocopy reviewed--normal 5/2015, repeat 10 years     Mammography reviewed--normal 5/2020, repeat 5/2021.  Pap reviewed --s/p hysterectomy. no need to repeat based on age and risk factors.   Routine Dental and Eye care recommended  Discussed importance of regular exercise and appropriate calcium intake  Discussed Advance Directives--on file, patient reports up-to-date.  -     Comprehensive Metabolic Panel; Future  -     HM2(CBC w/o Differential); Future    Recurrent major depressive disorder, in partial remission (H)  Stable. refill given.   -     buPROPion (WELLBUTRIN XL) 150 MG 24 hr tablet; Take 2 tablets (300 mg total) by mouth daily.  -     Thyroid Cascade; Future  -     Vitamin D, Total (25-Hydroxy); Future    Screening for hyperlipidemia  -     Lipid Cascade RANDOM; Future    Psoriatic arthritis (H)  Rheumatoid arthritis with positive rheumatoid factor, involving unspecified site (H)  Stable. Seeing rheumatology    Mild intermittent asthma without complication  Stable.    Moderate major depression (H)  See above.    Insomnia, unspecified type  Hard to sleep. Advised melatonin. Will increase trazodone to 75 mg daily.   -     traZODone (DESYREL) 50 MG tablet; Take 1.5 tablets (75 mg total) by mouth at bedtime.    Heart burn  Gastroesophageal reflux disease with esophagitis without hemorrhage  Symptoms consistent with gerd. Will trial omeprazole for next 3-4 weeks, if not improving will  pursue upper GI endoscopy. Pt agreed with plan.   -     omeprazole (PRILOSEC) 20 MG capsule; Take 1 capsule (20 mg total) by mouth daily before breakfast.    Next follow up:  Return in about 1 year (around 2/25/2022) for Routine preventive.    Immunization Review  Adult Imm Review: No immunizations due today  discussed shingrix vaccine  Documented tobacco use.  In remission.     I discussed the following with the patient:   Adult Healthy Living: Importance of regular exercise  Healthy nutrition  Getting adequate sleep  Stress management    I have had an Advance Directives discussion with the patient.    Subjective:   Chief Complaint: Wandy Tello is an 56 y.o. female here for a preventative health visit.    Patient has been advised of split billing requirements and indicates understanding: Yes  HPI:    Chief Complaint   Patient presents with   ? Annual Exam     Increased heartburn lately- hasn't tried anything for this yet. Having decreased appetite for the last couple months. Currently on predisone for arthritis      Not on any inhaler any more. Last use a year. Hasn't used rescue inhaler for a while. Asthma trigger was smoking. Has quit smoking 1.5 years.   Works for [employer]  Acid reflux - belching, epigastric pain, difficulty swallowing bread.     Healthy Habits  Are you taking a daily aspirin? No  Do you typically exercising at least 40 min, 3-4 times per week?  NO  Do you usually eat at least 4 servings of fruit and vegetables a day, include whole grains and fiber and avoid regularly eating high fat foods? NO  Have you had an eye exam in the past two years? Yes  Do you see a dentist twice per year? Yes  Do you have any concerns regarding sleep? YES - she still wakes up every 4 hours, on trazodone.     Safety Screen  If you own firearms, are they secured in a locked gun cabinet or with trigger locks? Yes  Do you feel you are safe where you are living?: Yes (2/25/2021  3:35 PM)  Do you  "feel you are safe in your relationship(s)?: Yes (2/25/2021  3:35 PM)      Review of Systems:  Please see above.  The rest of the review of systems are negative for all systems.     Pap History:   Status post benign hysterectomy. Health Maintenance and Surgical History updated.  Cancer Screening       Status Date      MAMMOGRAM Next Due 5/20/2022      Done 5/20/2020 MAMMO SCREENING BILATERAL     Patient has more history with this topic...    PAP SMEAR This plan is no longer active.           Patient Care Team:  Yaya Vazquez MD as PCP - General (Family Medicine)  Yaya Vazquez MD as Assigned PCP  Marilyn Purcell MD as Assigned Pulmonology Provider        History     Reviewed By Date/Time Sections Reviewed    Radha Guerrero CMA 2/25/2021  3:35 PM Tobacco            Objective:   Vital Signs:   Visit Vitals  /77 (Patient Site: Left Arm, Patient Position: Sitting, Cuff Size: Adult Large)   Pulse 79   Ht 4' 11.5\" (1.511 m)   Wt 146 lb 12.8 oz (66.6 kg)   LMP 12/08/2009   SpO2 94%   BMI 29.15 kg/m           PHYSICAL EXAM  GENERAL: Healthy, alert and no distress  RESP: lungs clear to auscultation - no rales, rhonchi or wheezes, no rales , no rhonchi and no wheezes  CV: regular rates and rhythm, normal S1 S2, no S3 or S4 and no murmur, click or rub  NEURO: Cranial nerves grossly intact. Mentation and speech appropriate for age.  PSYCH: Mentation appears normal, affect normal/bright, judgement and insight intact, normal speech and appearance well-groomed      The 10-year ASCVD risk score (Arelis DC Jr., et al., 2013) is: 1.6%    Values used to calculate the score:      Age: 56 years      Sex: Female      Is Non- : No      Diabetic: No      Tobacco smoker: No      Systolic Blood Pressure: 120 mmHg      Is BP treated: No      HDL Cholesterol: 71 mg/dL      Total Cholesterol: 211 mg/dL         Medication List          Accurate as of February 25, 2021  3:58 PM. If you have any " questions, ask your nurse or doctor.            CHANGE how you take these medications    traZODone 50 MG tablet  Also known as: DESYREL  INSTRUCTIONS: Take 1.5 tablets (75 mg total) by mouth at bedtime.  What changed: how much to take  Changed by: Casandra Brand MD           CONTINUE taking these medications    albuterol 90 mcg/actuation inhaler  Also known as: ProAir HFA  INSTRUCTIONS: Inhale 2 puffs every 6 (six) hours as needed for wheezing.  Doctor's comments: May substitute the equivalent medication per insurance preference.        buPROPion 150 MG 24 hr tablet  Also known as: WELLBUTRIN XL  INSTRUCTIONS: Take 2 tablets (300 mg total) by mouth daily.        cetirizine 10 MG tablet  Also known as: ZyrTEC  INSTRUCTIONS: Take 1 tablet (10 mg total) by mouth daily.        folic acid 1 MG tablet  Also known as: FOLVITE        methotrexate 2.5 MG tablet  INSTRUCTIONS: Take 30 mg by mouth.        METHOTREXATE ORAL  INSTRUCTIONS: Take by mouth. TAKES 1 MG TABLETS 6MG TWICE DAILY X 1 WEEK        PLAQUENIL ORAL  INSTRUCTIONS: Take by mouth. TAKES 2 TABLETS DAILY BUT IS UNSURE OF STRENGTH        predniSONE 5 MG tablet  Also known as: DELTASONE        sulfaSALAzine 500 mg tablet  Also known as: AZULFIDINE  INSTRUCTIONS: Take 1 tablet twice a day for 2 weeks, then 2 tablets twice a day thereafter        Tylenol Extra Strength 500 MG tablet  INSTRUCTIONS: Take 500 mg by mouth every 6 (six) hours as needed for pain.  Generic drug: acetaminophen        valACYclovir 500 MG tablet  Also known as: VALTREX  INSTRUCTIONS: Take 1 tablet (500 mg total) by mouth 2 (two) times a day.  Reason for med: KNOWN/SUSPECTED INFECTION           STOP taking these medications    budesonide-formoteroL 160-4.5 mcg/actuation inhaler  Also known as: SYMBICORT  Stopped by: Casandra Brand MD     methocarbamoL 500 MG tablet  Also known as: ROBAXIN  Stopped by: Casandra Brand MD           Where to Get Your Medications      These medications were  sent to [facility] - [place], [place] - [facility] [address] [address]    Phone: [phone]     buPROPion 150 MG 24 hr tablet    traZODone 50 MG tablet         Additional Screenings Completed Today:

## 2021-10-01 ENCOUNTER — LAB (OUTPATIENT)
Dept: LAB | Facility: CLINIC | Age: 56
End: 2021-10-01
Payer: OTHER GOVERNMENT

## 2021-10-01 DIAGNOSIS — M05.9 RHEUMATOID ARTHRITIS WITH POSITIVE RHEUMATOID FACTOR, INVOLVING UNSPECIFIED SITE (H): Primary | ICD-10-CM

## 2021-10-01 LAB
ALT SERPL W P-5'-P-CCNC: 16 U/L (ref 0–45)
AST SERPL W P-5'-P-CCNC: 17 U/L (ref 0–40)
CREAT SERPL-MCNC: 0.75 MG/DL (ref 0.6–1.1)
ERYTHROCYTE [DISTWIDTH] IN BLOOD BY AUTOMATED COUNT: 12.9 % (ref 10–15)
GFR SERPL CREATININE-BSD FRML MDRD: 89 ML/MIN/1.73M2
HCT VFR BLD AUTO: 41 % (ref 35–47)
HGB BLD-MCNC: 13.6 G/DL (ref 11.7–15.7)
MCH RBC QN AUTO: 32.9 PG (ref 26.5–33)
MCHC RBC AUTO-ENTMCNC: 33.2 G/DL (ref 31.5–36.5)
MCV RBC AUTO: 99 FL (ref 78–100)
PLATELET # BLD AUTO: 276 10E3/UL (ref 150–450)
RBC # BLD AUTO: 4.13 10E6/UL (ref 3.8–5.2)
WBC # BLD AUTO: 5.3 10E3/UL (ref 4–11)

## 2021-10-01 PROCEDURE — 84450 TRANSFERASE (AST) (SGOT): CPT

## 2021-10-01 PROCEDURE — 84460 ALANINE AMINO (ALT) (SGPT): CPT

## 2021-10-01 PROCEDURE — 36415 COLL VENOUS BLD VENIPUNCTURE: CPT

## 2021-10-01 PROCEDURE — 82565 ASSAY OF CREATININE: CPT

## 2021-10-01 PROCEDURE — 85027 COMPLETE CBC AUTOMATED: CPT

## 2021-10-16 ENCOUNTER — HEALTH MAINTENANCE LETTER (OUTPATIENT)
Age: 56
End: 2021-10-16

## 2021-10-19 PROBLEM — F32.9 MAJOR DEPRESSION: Status: ACTIVE | Noted: 2021-02-25

## 2021-12-21 ENCOUNTER — MYC MEDICAL ADVICE (OUTPATIENT)
Dept: FAMILY MEDICINE | Facility: CLINIC | Age: 56
End: 2021-12-21
Payer: OTHER GOVERNMENT

## 2021-12-21 DIAGNOSIS — F17.211 CIGARETTE NICOTINE DEPENDENCE IN REMISSION: Primary | ICD-10-CM

## 2021-12-21 DIAGNOSIS — Z87.891 PERSONAL HISTORY OF TOBACCO USE, PRESENTING HAZARDS TO HEALTH: ICD-10-CM

## 2021-12-27 ENCOUNTER — TELEPHONE (OUTPATIENT)
Dept: FAMILY MEDICINE | Facility: CLINIC | Age: 56
End: 2021-12-27
Payer: OTHER GOVERNMENT

## 2021-12-27 DIAGNOSIS — Z87.891 PERSONAL HISTORY OF TOBACCO USE, PRESENTING HAZARDS TO HEALTH: Primary | ICD-10-CM

## 2021-12-27 NOTE — TELEPHONE ENCOUNTER
Incoming fax from pharmacy stating the chantix 0.5/1mg is unavailable due to a recall. All strengths and forms of chantix are unavailable. A possible alternative is the varenicline tab 0.5mg and 1mg. If appropriate to substitute send new rx.

## 2021-12-30 RX ORDER — VARENICLINE TARTRATE 0.5 MG/1
TABLET, FILM COATED ORAL
Qty: 11 TABLET | Refills: 0 | Status: SHIPPED | OUTPATIENT
Start: 2021-12-30 | End: 2022-01-06

## 2021-12-30 RX ORDER — VARENICLINE TARTRATE 1 MG/1
1 TABLET, FILM COATED ORAL 2 TIMES DAILY
Qty: 120 TABLET | Refills: 1 | Status: SHIPPED | OUTPATIENT
Start: 2022-01-06

## 2022-01-12 ENCOUNTER — LAB REQUISITION (OUTPATIENT)
Dept: LAB | Facility: HOSPITAL | Age: 57
End: 2022-01-12

## 2022-01-12 ENCOUNTER — APPOINTMENT (OUTPATIENT)
Dept: URGENT CARE | Facility: CLINIC | Age: 57
End: 2022-01-12
Payer: OTHER GOVERNMENT

## 2022-01-12 LAB — SARS-COV-2 RNA RESP QL NAA+PROBE: POSITIVE

## 2022-01-12 PROCEDURE — U0005 INFEC AGEN DETEC AMPLI PROBE: HCPCS | Performed by: INTERNAL MEDICINE

## 2022-03-15 DIAGNOSIS — F33.41 RECURRENT MAJOR DEPRESSIVE DISORDER, IN PARTIAL REMISSION (H): ICD-10-CM

## 2022-03-15 NOTE — TELEPHONE ENCOUNTER
"Routing refill request to provider for review/approval because:  PHQ 9 score - not on file/out of date.  Patient needs to be seen because it has been more than 1 year since last office visit.    Last Written Prescription Date:  2/25/21  Last Fill Quantity: 180,  # refills: 3   Last office visit provider:  2/25/21     Requested Prescriptions   Pending Prescriptions Disp Refills     buPROPion (WELLBUTRIN XL) 150 MG 24 hr tablet [Pharmacy Med Name: BUPROPION HCL XL TABS 150MG] 180 tablet 3     Sig: TAKE 2 TABLETS DAILY       SSRIs Protocol Failed - 3/15/2022 12:54 PM        Failed - PHQ-9 score less than 5 in past 6 months     Please review last PHQ-9 score.           Failed - Recent (6 mo) or future (30 days) visit within the authorizing provider's specialty     Patient had office visit in the last 6 months or has a visit in the next 30 days with authorizing provider or within the authorizing provider's specialty.  See \"Patient Info\" tab in inbasket, or \"Choose Columns\" in Meds & Orders section of the refill encounter.            Passed - Medication is Bupropion     If the medication is Bupropion (Wellbutrin), and the patient is taking for smoking cessation; OK to refill.          Passed - Medication is active on med list        Passed - Patient is age 18 or older        Passed - No active pregnancy on record        Passed - No positive pregnancy test in last 12 months             Umer Zhou RN 03/15/22 12:54 PM  "

## 2022-03-17 RX ORDER — BUPROPION HYDROCHLORIDE 150 MG/1
300 TABLET ORAL DAILY
Qty: 180 TABLET | Refills: 0 | Status: SHIPPED | OUTPATIENT
Start: 2022-03-17

## 2022-04-02 ENCOUNTER — HEALTH MAINTENANCE LETTER (OUTPATIENT)
Age: 57
End: 2022-04-02

## 2022-07-15 ENCOUNTER — IMMUNIZATION (OUTPATIENT)
Dept: NURSING | Facility: CLINIC | Age: 57
End: 2022-07-15
Payer: OTHER GOVERNMENT

## 2022-07-15 PROCEDURE — 0054A COVID-19,PF,PFIZER (12+ YRS): CPT

## 2022-07-15 PROCEDURE — 91305 COVID-19,PF,PFIZER (12+ YRS): CPT

## 2022-07-23 ENCOUNTER — HEALTH MAINTENANCE LETTER (OUTPATIENT)
Age: 57
End: 2022-07-23

## 2022-09-25 ENCOUNTER — HEALTH MAINTENANCE LETTER (OUTPATIENT)
Age: 57
End: 2022-09-25

## 2022-12-06 ENCOUNTER — OFFICE VISIT (OUTPATIENT)
Dept: FAMILY MEDICINE | Facility: CLINIC | Age: 57
End: 2022-12-06
Payer: OTHER GOVERNMENT

## 2022-12-06 VITALS
HEART RATE: 69 BPM | OXYGEN SATURATION: 97 % | TEMPERATURE: 98 F | SYSTOLIC BLOOD PRESSURE: 139 MMHG | RESPIRATION RATE: 18 BRPM | DIASTOLIC BLOOD PRESSURE: 83 MMHG

## 2022-12-06 DIAGNOSIS — J02.9 SORE THROAT: ICD-10-CM

## 2022-12-06 DIAGNOSIS — H69.91 DYSFUNCTION OF RIGHT EUSTACHIAN TUBE: Primary | ICD-10-CM

## 2022-12-06 LAB
DEPRECATED S PYO AG THROAT QL EIA: NEGATIVE
GROUP A STREP BY PCR: NOT DETECTED

## 2022-12-06 PROCEDURE — 87651 STREP A DNA AMP PROBE: CPT | Performed by: PHYSICIAN ASSISTANT

## 2022-12-06 PROCEDURE — 99213 OFFICE O/P EST LOW 20 MIN: CPT | Performed by: PHYSICIAN ASSISTANT

## 2022-12-06 RX ORDER — FLUTICASONE PROPIONATE 50 MCG
1 SPRAY, SUSPENSION (ML) NASAL DAILY
Qty: 9.9 ML | Refills: 0 | Status: SHIPPED | OUTPATIENT
Start: 2022-12-06 | End: 2023-01-05

## 2022-12-06 RX ORDER — CETIRIZINE HYDROCHLORIDE 10 MG/1
10 TABLET ORAL DAILY
Qty: 30 TABLET | Refills: 0 | Status: SHIPPED | OUTPATIENT
Start: 2022-12-06 | End: 2023-01-05

## 2022-12-06 NOTE — PROGRESS NOTES
Patient presents with:  Ear Problem: Rt ear pain today also has sore throat last night      Clinical Decision Making:  Strep test was obtained and was negative.  Culture is to follow.  COVID-19 screening test is negative at home.  Patient had 2 at home COVID test as late as this morning which were both returned as negative.  Use of over-the-counter Zyrtec and Flonase to help with eustachian tube dysfunction.  Symptomatic care was gone over. Expected course of resolution and indication for return was gone over and questions were answered to patient/parent's satisfaction before discharge.        ICD-10-CM    1. Dysfunction of right eustachian tube  H69.81 cetirizine (ZYRTEC) 10 MG tablet     fluticasone (FLONASE) 50 MCG/ACT nasal spray      2. Sore throat  J02.9 Streptococcus A Rapid Screen w/Reflex to PCR - Clinic Collect     Group A Streptococcus PCR Throat Swab          Patient Instructions   Use of over-the-counter Zyrtec.  Follow packaging directions  Use of over-the-counter Flonase  Frequent swallowing drinking and chewing gum to help create low-grade suction on the eustachian tube.  Elevate head at nighttime so it does not increase pressure  Use of over-the-counter Tylenol as needed for comfort.  Return to primary care provider for reevaluation treatment if any complication or new symptoms should present.      Patient Education     Earache, No Infection (Adult)  Earaches can happen without an infection. This occurs when air and fluid build up behind the eardrum causing a feeling of fullness and discomfort and reduced hearing. This is called otitis media with effusion (OME) or serous otitis media. It means there is fluid in the middle ear. It is not the same as acute otitis media, which is typically from infection.  OME can happen when you have a cold if congestion blocks the passage that drains the middle ear. This passage is called the eustachian tube. OME may also occur with nasal allergies or after a  bacterial middle ear infection.    The pain or discomfort may come and go. You may hear clicking or popping sounds when you chew or swallow. You may feel that your balance is off. Or you may hear ringing in the ear.  It often takes from several weeks up to 3 months for the fluid to clear on its own. Oral pain relievers and ear drops help if there is pain. Decongestants and antihistamines sometimes help. Antibiotics don't help since there is no infection. Your doctor may prescribe a nasal spray to help reduce swelling in the nose and eustachian tube. This can allow the ear to drain.  If your OME doesn't improve after 3 months, surgery may be used to drain the fluid and insert a small tube in the eardrum to allow continued drainage.  Because the middle ear fluid can become infected, it is important to watch for signs of an ear infection which may develop later. These signs include increased ear pain, fever, or drainage from the ear.  Home care  The following guidelines will help you care for yourself at home:    You may use over-the-counter medicine as directed to control pain, unless another medicine was prescribed. If you have chronic liver or kidney disease or ever had a stomach ulcer or GI bleeding, talk with your doctor before using these medicines. Aspirin should never be used in anyone under 18 years of age who is ill with a fever. It may cause severe liver damage.    You may use over-the-counter decongestants such as phenylephrine or pseudoephedrine. But they are not always helpful. Don't use nasal spray decongestants more than 3 days. Longer use can make congestion worse. Prescription nasal sprays from your doctor don't typically have those restrictions.    Antihistamines may help if you are also having allergy symptoms.    You may use medicines such as guaifenesin to thin mucus and promote drainage.  Follow-up care  Follow up with your healthcare provider or as advised if you are not feeling better after 3  days.  When to seek medical advice  Call your healthcare provider right away if any of the following occur:    Your ear pain gets worse or does not start to improve     Fever of 100.4 F (38 C) or higher, or as directed by your healthcare provider    Fluid or blood draining from the ear    Headache or sinus pain    Stiff neck    Unusual drowsiness or confusion  Date Last Reviewed: 10/1/2016    4553-4852 [employer]. [address] [place] [address], [place] [address]. All rights reserved. This information is not intended as a substitute for professional medical care. Always follow your healthcare professional's instructions.               HPI:  Wandy Tello is a 57 year old female who has had quite extensive history of myringotomy tube placement and ear infections while she was younger who is presenting to clinic for bilateral ear pain with the right greater than the left.  Patient has also had sore throat odynophagia with right ear pain being worse on the left.  She has not had otorrhea hearing or balance deficits.  No cough headache myalgias arthralgias fever chills night sweats vomiting diarrhea fatigue loss of appetite.  She does have a history of rheumatoid arthritis but no new complaints to address.    Patient has had COVID testing at home with 2 tests over the last week with the last one this morning which was returned as negative and the other one on Sunday, 3 days ago    History obtained from chart review and the patient.    Problem List:  2021-02: Rheumatoid arthritis (H)  2021-02: Moderate major depression (H)  2019-02: Psoriatic arthritis (H)  2018-07: Mild intermittent asthma without complication  2017-09: Polyarthralgia  2017-09: Rheumatoid factor positive  2017-09: Nicotine dependence in remission  Herpes Simplex Type II  Raynaud's Disease  Changed Sexual Interest (Libido): Decreased      Past Medical History:   Diagnosis Date     Asthma        Social History     Tobacco Use     Smoking  status: Former     Packs/day: 0.75     Years: 5.00     Pack years: 3.75     Types: Cigarettes     Quit date: 2017     Years since quittin.6     Smokeless tobacco: Never     Tobacco comments:     treated with chantix   Substance Use Topics     Alcohol use: Yes     Alcohol/week: 0.0 standard drinks       Review of Systems  As above in HPI otherwise negative.    Vitals:    22 1302   BP: 139/83   Pulse: 69   Resp: 18   Temp: 98  F (36.7  C)   TempSrc: Oral   SpO2: 97%       General: Patient is resting comfortably no acute distress is afebrile  HEENT: Head is normocephalic atraumatic   eyes are PERRL EOMI sclera anicteric   TMs are with fluid in the middle ear and effusions but no erythema and there is scarring on the tympanic membrane's  Pain to palpation of the right course of the eustachian tube  Throat is with mild pharyngeal wall erythema and no exudate  No cervical lymphadenopathy present  LUNGS: Clear to auscultation bilaterally  HEART: Regular rate and rhythm  Skin: Without rash non-diaphoretic    Physical Exam      Labs:  Results for orders placed or performed in visit on 22   Streptococcus A Rapid Screen w/Reflex to PCR - Clinic Collect     Status: Normal    Specimen: Throat; Swab   Result Value Ref Range    Group A Strep antigen Negative Negative     At the end of the encounter, I discussed results, diagnosis, medications. Discussed red flags for immediate return to clinic/ER, as well as indications for follow up if no improvement. Patient understood and agreed to plan. Patient was stable for discharge.

## 2022-12-06 NOTE — PATIENT INSTRUCTIONS
Use of over-the-counter Zyrtec.  Follow packaging directions  Use of over-the-counter Flonase  Frequent swallowing drinking and chewing gum to help create low-grade suction on the eustachian tube.  Elevate head at nighttime so it does not increase pressure  Use of over-the-counter Tylenol as needed for comfort.  Return to primary care provider for reevaluation treatment if any complication or new symptoms should present.      Patient Education     Earache, No Infection (Adult)  Earaches can happen without an infection. This occurs when air and fluid build up behind the eardrum causing a feeling of fullness and discomfort and reduced hearing. This is called otitis media with effusion (OME) or serous otitis media. It means there is fluid in the middle ear. It is not the same as acute otitis media, which is typically from infection.  OME can happen when you have a cold if congestion blocks the passage that drains the middle ear. This passage is called the eustachian tube. OME may also occur with nasal allergies or after a bacterial middle ear infection.    The pain or discomfort may come and go. You may hear clicking or popping sounds when you chew or swallow. You may feel that your balance is off. Or you may hear ringing in the ear.  It often takes from several weeks up to 3 months for the fluid to clear on its own. Oral pain relievers and ear drops help if there is pain. Decongestants and antihistamines sometimes help. Antibiotics don't help since there is no infection. Your doctor may prescribe a nasal spray to help reduce swelling in the nose and eustachian tube. This can allow the ear to drain.  If your OME doesn't improve after 3 months, surgery may be used to drain the fluid and insert a small tube in the eardrum to allow continued drainage.  Because the middle ear fluid can become infected, it is important to watch for signs of an ear infection which may develop later. These signs include increased ear pain,  fever, or drainage from the ear.  Home care  The following guidelines will help you care for yourself at home:  You may use over-the-counter medicine as directed to control pain, unless another medicine was prescribed. If you have chronic liver or kidney disease or ever had a stomach ulcer or GI bleeding, talk with your doctor before using these medicines. Aspirin should never be used in anyone under 18 years of age who is ill with a fever. It may cause severe liver damage.  You may use over-the-counter decongestants such as phenylephrine or pseudoephedrine. But they are not always helpful. Don't use nasal spray decongestants more than 3 days. Longer use can make congestion worse. Prescription nasal sprays from your doctor don't typically have those restrictions.  Antihistamines may help if you are also having allergy symptoms.  You may use medicines such as guaifenesin to thin mucus and promote drainage.  Follow-up care  Follow up with your healthcare provider or as advised if you are not feeling better after 3 days.  When to seek medical advice  Call your healthcare provider right away if any of the following occur:  Your ear pain gets worse or does not start to improve   Fever of 100.4 F (38 C) or higher, or as directed by your healthcare provider  Fluid or blood draining from the ear  Headache or sinus pain  Stiff neck  Unusual drowsiness or confusion  Date Last Reviewed: 10/1/2016    2545-0009 [employer]. [address] [place] [address]. All rights reserved. This information is not intended as a substitute for professional medical care. Always follow your healthcare professional's instructions.

## 2023-05-13 ENCOUNTER — HEALTH MAINTENANCE LETTER (OUTPATIENT)
Age: 58
End: 2023-05-13

## 2023-10-25 ENCOUNTER — LAB (OUTPATIENT)
Dept: LAB | Facility: CLINIC | Age: 58
End: 2023-10-25
Payer: OTHER GOVERNMENT

## 2023-10-25 DIAGNOSIS — Z79.899 ENCOUNTER FOR LONG-TERM (CURRENT) USE OF MEDICATIONS: Primary | ICD-10-CM

## 2023-10-25 LAB
ERYTHROCYTE [DISTWIDTH] IN BLOOD BY AUTOMATED COUNT: 12 % (ref 10–15)
HCT VFR BLD AUTO: 39.6 % (ref 35–47)
HGB BLD-MCNC: 13.4 G/DL (ref 11.7–15.7)
MCH RBC QN AUTO: 32 PG (ref 26.5–33)
MCHC RBC AUTO-ENTMCNC: 33.8 G/DL (ref 31.5–36.5)
MCV RBC AUTO: 95 FL (ref 78–100)
PLATELET # BLD AUTO: 235 10E3/UL (ref 150–450)
RBC # BLD AUTO: 4.19 10E6/UL (ref 3.8–5.2)
WBC # BLD AUTO: 7 10E3/UL (ref 4–11)

## 2023-10-25 PROCEDURE — 84460 ALANINE AMINO (ALT) (SGPT): CPT

## 2023-10-25 PROCEDURE — 85027 COMPLETE CBC AUTOMATED: CPT

## 2023-10-25 PROCEDURE — 84450 TRANSFERASE (AST) (SGOT): CPT

## 2023-10-25 PROCEDURE — 36415 COLL VENOUS BLD VENIPUNCTURE: CPT

## 2023-10-26 LAB
ALT SERPL W P-5'-P-CCNC: 11 U/L (ref 0–50)
AST SERPL W P-5'-P-CCNC: 23 U/L (ref 0–45)

## 2024-04-02 DIAGNOSIS — Z13.220 LIPID SCREENING: Primary | ICD-10-CM

## 2024-04-02 DIAGNOSIS — Z00.01 ENCOUNTER FOR GENERAL ADULT MEDICAL EXAMINATION WITH ABNORMAL FINDINGS: ICD-10-CM

## 2024-04-11 DIAGNOSIS — Z78.0 POSTMENOPAUSAL STATUS: Primary | ICD-10-CM

## 2024-04-15 ENCOUNTER — LAB (OUTPATIENT)
Dept: LAB | Facility: CLINIC | Age: 59
End: 2024-04-15
Payer: OTHER GOVERNMENT

## 2024-04-15 DIAGNOSIS — Z00.01 ENCOUNTER FOR GENERAL ADULT MEDICAL EXAMINATION WITH ABNORMAL FINDINGS: ICD-10-CM

## 2024-04-15 DIAGNOSIS — Z79.899 ENCOUNTER FOR LONG-TERM (CURRENT) USE OF MEDICATIONS: ICD-10-CM

## 2024-04-15 DIAGNOSIS — Z13.220 LIPID SCREENING: ICD-10-CM

## 2024-04-15 LAB
ALBUMIN SERPL BCG-MCNC: 4.2 G/DL (ref 3.5–5.2)
ALP SERPL-CCNC: 75 U/L (ref 40–150)
ALT SERPL W P-5'-P-CCNC: 11 U/L (ref 0–50)
ANION GAP SERPL CALCULATED.3IONS-SCNC: 8 MMOL/L (ref 7–15)
AST SERPL W P-5'-P-CCNC: 18 U/L (ref 0–45)
BASOPHILS # BLD AUTO: 0 10E3/UL (ref 0–0.2)
BASOPHILS NFR BLD AUTO: 0 %
BILIRUB SERPL-MCNC: 0.3 MG/DL
BUN SERPL-MCNC: 10.8 MG/DL (ref 8–23)
CALCIUM SERPL-MCNC: 9.1 MG/DL (ref 8.6–10)
CHLORIDE SERPL-SCNC: 108 MMOL/L (ref 98–107)
CHOLEST SERPL-MCNC: 191 MG/DL
CREAT SERPL-MCNC: 0.63 MG/DL (ref 0.51–0.95)
DEPRECATED HCO3 PLAS-SCNC: 27 MMOL/L (ref 22–29)
EGFRCR SERPLBLD CKD-EPI 2021: >90 ML/MIN/1.73M2
EOSINOPHIL # BLD AUTO: 0.5 10E3/UL (ref 0–0.7)
EOSINOPHIL NFR BLD AUTO: 9 %
ERYTHROCYTE [DISTWIDTH] IN BLOOD BY AUTOMATED COUNT: 11.9 % (ref 10–15)
FASTING STATUS PATIENT QL REPORTED: YES
GLUCOSE SERPL-MCNC: 95 MG/DL (ref 70–99)
HCT VFR BLD AUTO: 36.3 % (ref 35–47)
HDLC SERPL-MCNC: 72 MG/DL
HGB BLD-MCNC: 12.1 G/DL (ref 11.7–15.7)
IMM GRANULOCYTES # BLD: 0 10E3/UL
IMM GRANULOCYTES NFR BLD: 0 %
LDLC SERPL CALC-MCNC: 104 MG/DL
LYMPHOCYTES # BLD AUTO: 2.2 10E3/UL (ref 0.8–5.3)
LYMPHOCYTES NFR BLD AUTO: 36 %
MCH RBC QN AUTO: 31.9 PG (ref 26.5–33)
MCHC RBC AUTO-ENTMCNC: 33.3 G/DL (ref 31.5–36.5)
MCV RBC AUTO: 96 FL (ref 78–100)
MONOCYTES # BLD AUTO: 0.6 10E3/UL (ref 0–1.3)
MONOCYTES NFR BLD AUTO: 11 %
NEUTROPHILS # BLD AUTO: 2.6 10E3/UL (ref 1.6–8.3)
NEUTROPHILS NFR BLD AUTO: 44 %
NONHDLC SERPL-MCNC: 119 MG/DL
PLATELET # BLD AUTO: 200 10E3/UL (ref 150–450)
POTASSIUM SERPL-SCNC: 4.7 MMOL/L (ref 3.4–5.3)
PROT SERPL-MCNC: 6.5 G/DL (ref 6.4–8.3)
RBC # BLD AUTO: 3.79 10E6/UL (ref 3.8–5.2)
SODIUM SERPL-SCNC: 143 MMOL/L (ref 135–145)
TRIGL SERPL-MCNC: 73 MG/DL
WBC # BLD AUTO: 6 10E3/UL (ref 4–11)

## 2024-04-15 PROCEDURE — 85025 COMPLETE CBC W/AUTO DIFF WBC: CPT

## 2024-04-15 PROCEDURE — 80053 COMPREHEN METABOLIC PANEL: CPT

## 2024-04-15 PROCEDURE — 36415 COLL VENOUS BLD VENIPUNCTURE: CPT

## 2024-04-15 PROCEDURE — 80061 LIPID PANEL: CPT

## 2024-07-17 ENCOUNTER — ANCILLARY PROCEDURE (OUTPATIENT)
Dept: BONE DENSITY | Facility: CLINIC | Age: 59
End: 2024-07-17
Attending: FAMILY MEDICINE
Payer: OTHER GOVERNMENT

## 2024-07-17 DIAGNOSIS — Z78.0 POSTMENOPAUSAL STATUS: ICD-10-CM

## 2024-07-17 PROCEDURE — 77080 DXA BONE DENSITY AXIAL: CPT | Mod: TC | Performed by: PHYSICIAN ASSISTANT

## 2024-07-17 PROCEDURE — 77089 TBS DXA CAL W/I&R FX RISK: CPT | Performed by: PHYSICIAN ASSISTANT

## 2024-07-20 ENCOUNTER — HEALTH MAINTENANCE LETTER (OUTPATIENT)
Age: 59
End: 2024-07-20

## 2024-11-19 ENCOUNTER — OFFICE VISIT (OUTPATIENT)
Dept: URGENT CARE | Facility: URGENT CARE | Age: 59
End: 2024-11-19
Payer: OTHER GOVERNMENT

## 2024-11-19 VITALS
TEMPERATURE: 98.1 F | HEART RATE: 78 BPM | RESPIRATION RATE: 16 BRPM | BODY MASS INDEX: 2.38 KG/M2 | DIASTOLIC BLOOD PRESSURE: 87 MMHG | OXYGEN SATURATION: 98 % | SYSTOLIC BLOOD PRESSURE: 131 MMHG | WEIGHT: 12 LBS

## 2024-11-19 DIAGNOSIS — J01.00 ACUTE NON-RECURRENT MAXILLARY SINUSITIS: Primary | ICD-10-CM

## 2024-11-19 PROCEDURE — 99213 OFFICE O/P EST LOW 20 MIN: CPT | Performed by: PHYSICIAN ASSISTANT

## 2024-11-19 RX ORDER — CHLORHEXIDINE GLUCONATE ORAL RINSE 1.2 MG/ML
15 SOLUTION DENTAL
COMMUNITY
Start: 2024-10-01

## 2024-11-19 RX ORDER — PREDNISONE 20 MG/1
40 TABLET ORAL DAILY
Qty: 10 TABLET | Refills: 0 | Status: SHIPPED | OUTPATIENT
Start: 2024-11-19 | End: 2024-11-24

## 2024-11-19 RX ORDER — FLUTICASONE PROPIONATE 50 MCG
SPRAY, SUSPENSION (ML) NASAL
COMMUNITY
Start: 2023-03-02

## 2024-11-19 NOTE — PROGRESS NOTES
"  Assessment & Plan:      Problem List Items Addressed This Visit    None  Visit Diagnoses       Acute non-recurrent maxillary sinusitis    -  Primary    Relevant Medications    fluticasone (FLONASE) 50 MCG/ACT nasal spray    amoxicillin-clavulanate (AUGMENTIN) 875-125 MG tablet    predniSONE (DELTASONE) 20 MG tablet          Medical Decision Making  Patient presents with worsening pains on the left side of the face over the course of 1 month.  Symptoms today appear concerning for bacterial sinusitis.  Recommend oral antibiotics and trial of oral steroids.  Discussed treatment and symptomatic care.  Allergies and medication interactions reviewed.  Discussed signs of worsening symptoms and when to follow-up with PCP if no symptom improvement.     Subjective:      Wandy Tello is a 59 year old female here for evaluation of pains of the left side of the face.  Onset of symptoms was 1 month ago.  Patient initially went to the dentist and had a \"deep cleaning\".  She noticed her teeth on the lower left were very sensitive.  Patient went back to the dentist and they stated there was no abnormalities with the teeth.  Then over the last week patient now noting increased pressure across the left side of the face associated with headaches.  Patient has had similar symptoms when she developed sinus infections.  Patient otherwise does not feel severely congested.  No recent cold-like illnesses or seasonal allergies.     The following portions of the patient's history were reviewed and updated as appropriate: allergies, current medications, and problem list.     Review of Systems  Pertinent items are noted in HPI.    Allergies  Allergies   Allergen Reactions    Ibuprofen Unknown    Pollen [Pollen Extract] Unknown       Family History   Problem Relation Age of Onset    Hyperlipidemia Mother     Glaucoma Mother     Hyperlipidemia Father     Chronic Obstructive Pulmonary Disease Father         non-smoker    Glaucoma Father     " Diabetes Type 2  Father     Obesity Sister     Asthma Daughter         Hospitalized in the past due to asthma       Social History     Tobacco Use    Smoking status: Former     Current packs/day: 0.00     Average packs/day: 0.8 packs/day for 5.0 years (3.8 ttl pk-yrs)     Types: Cigarettes     Start date: 2012     Quit date: 2017     Years since quittin.5    Smokeless tobacco: Never    Tobacco comments:     treated with chantix   Substance Use Topics    Alcohol use: Yes     Alcohol/week: 0.0 standard drinks of alcohol        Objective:      /87 (BP Location: Right arm, Patient Position: Sitting, Cuff Size: Adult Regular)   Pulse 78   Temp 98.1  F (36.7  C) (Oral)   Resp 16   Wt (!) 5.443 kg (12 lb)   SpO2 98%   BMI 2.38 kg/m    General appearance - alert, well appearing, and in no distress and non-toxic  Ears - TMs intact with significant mucoid fluid and bulging bilaterally, no erythema  Nose - tenderness to percussion over the left maxillary sinus  Mouth - mucous membranes moist, pharynx normal without lesions  Neck - supple, no significant adenopathy    The use of Dragon/Appear Here dictation services was used to construct the content of this note; any grammatical errors are non-intentional. Please contact the author directly if you are in need of any clarification.

## 2024-12-07 ENCOUNTER — HEALTH MAINTENANCE LETTER (OUTPATIENT)
Age: 59
End: 2024-12-07